# Patient Record
Sex: MALE | Race: WHITE | NOT HISPANIC OR LATINO
[De-identification: names, ages, dates, MRNs, and addresses within clinical notes are randomized per-mention and may not be internally consistent; named-entity substitution may affect disease eponyms.]

---

## 2017-02-18 ENCOUNTER — LAB SERVICES (OUTPATIENT)
Dept: OTHER | Age: 32
End: 2017-02-18

## 2017-02-18 ENCOUNTER — CHARTING TRANS (OUTPATIENT)
Dept: OTHER | Age: 32
End: 2017-02-18

## 2017-02-18 LAB — RAPID STREP GROUP A: NORMAL

## 2017-06-21 ENCOUNTER — CHARTING TRANS (OUTPATIENT)
Dept: OTHER | Age: 32
End: 2017-06-21

## 2017-06-23 ENCOUNTER — HOSPITAL (OUTPATIENT)
Dept: OTHER | Age: 32
End: 2017-06-23
Attending: FAMILY MEDICINE

## 2017-06-23 ENCOUNTER — CHARTING TRANS (OUTPATIENT)
Dept: OTHER | Age: 32
End: 2017-06-23

## 2017-06-23 ENCOUNTER — LAB SERVICES (OUTPATIENT)
Dept: OTHER | Age: 32
End: 2017-06-23

## 2017-06-23 LAB
ALBUMIN SERPL-MCNC: 4.5 G/DL (ref 3.6–5.1)
ALBUMIN/GLOB SERPL: 1.8 (ref 1–2.4)
ALP SERPL-CCNC: 60 UNITS/L (ref 45–117)
ALT SERPL-CCNC: 25 UNITS/L
ANION GAP SERPL CALC-SCNC: 10 MMOL/L (ref 10–20)
AST SERPL-CCNC: 16 UNITS/L
BASOPHILS # BLD: 0 K/MCL (ref 0–0.3)
BASOPHILS NFR BLD: 1 %
BILIRUB SERPL-MCNC: 0.8 MG/DL (ref 0.2–1)
BUN SERPL-MCNC: 12 MG/DL (ref 6–20)
BUN/CREAT SERPL: 13 (ref 7–25)
C TRACH RRNA SPEC QL NAA+PROBE: NEGATIVE
CALCIUM SERPL-MCNC: 9.6 MG/DL (ref 8.4–10.2)
CHLORIDE SERPL-SCNC: 103 MMOL/L (ref 98–107)
CO2 SERPL-SCNC: 32 MMOL/L (ref 21–32)
CREAT SERPL-MCNC: 0.93 MG/DL (ref 0.67–1.17)
DIFFERENTIAL METHOD BLD: NORMAL
EOSINOPHIL # BLD: 0.1 K/MCL (ref 0.1–0.5)
EOSINOPHIL NFR BLD: 2 %
ERYTHROCYTE [DISTWIDTH] IN BLOOD: 13 % (ref 11–15)
GLOBULIN SER-MCNC: 2.5 G/DL (ref 2–4)
GLUCOSE SERPL-MCNC: 85 MG/DL (ref 65–99)
HEMATOCRIT: 45.7 % (ref 39–51)
HEMOGLOBIN: 15.4 G/DL (ref 13–17)
HIV 1+2 AB+HIV1 P24 AG SERPL QL IA: NONREACTIVE
LENGTH OF FAST TIME PATIENT: NORMAL HRS
LYMPHOCYTES # BLD: 2.3 K/MCL (ref 1–4.8)
LYMPHOCYTES NFR BLD: 39 %
MEAN CORPUSCULAR HEMOGLOBIN: 30.6 PG (ref 26–34)
MEAN CORPUSCULAR HGB CONC: 33.7 G/DL (ref 32–36.5)
MEAN CORPUSCULAR VOLUME: 90.9 FL (ref 78–100)
MONOCYTES # BLD: 0.3 K/MCL (ref 0.3–0.9)
MONOCYTES NFR BLD: 6 %
N GONORRHOEA RRNA SPEC QL NAA+PROBE: NEGATIVE
NEUTROPHILS # BLD: 3.1 K/MCL (ref 1.8–7.7)
NEUTROPHILS NFR BLD: 52 %
PLATELET COUNT: 329 K/MCL (ref 140–450)
POTASSIUM SERPL-SCNC: 4.3 MMOL/L (ref 3.4–5.1)
RED CELL COUNT: 5.03 MIL/MCL (ref 4.5–5.9)
RPR SER QL: NONREACTIVE
SODIUM SERPL-SCNC: 141 MMOL/L (ref 135–145)
SPECIMEN SOURCE: NORMAL
TOTAL PROTEIN: 7 G/DL (ref 6.4–8.2)
TSH SERPL-ACNC: 2.31 MCUNITS/ML (ref 0.35–5)
WHITE BLOOD COUNT: 5.8 K/MCL (ref 4.2–11)

## 2017-06-26 ENCOUNTER — CHARTING TRANS (OUTPATIENT)
Dept: OTHER | Age: 32
End: 2017-06-26

## 2017-06-26 LAB
APPEARANCE SEMIN PLAS: NORMAL
COLLECTION METHOD SMN: NORMAL
LIQUEFACTION TIME SMN: NORMAL
PH SMN: 8.5
SEX ABSTIN DURATION TIME PATIENT: 6 (ref 2–7)
SPECIMEN VOL SMN: 3 ML
SPERM # SMN: 190 MIL/ML
SPERM IMMOTILE NFR SMN: 37 %
SPERM MOTILE 1H P EJAC NFR SMN: 49 %
SPERM NONPROG NFR SMN: 14 %
SPERM NORM NFR SMN: 16 %
TYPE OF CONTAINER: NORMAL
VISC SMN QL: NORMAL

## 2017-08-18 ENCOUNTER — CHARTING TRANS (OUTPATIENT)
Dept: OTHER | Age: 32
End: 2017-08-18

## 2017-10-02 ENCOUNTER — CHARTING TRANS (OUTPATIENT)
Dept: OTHER | Age: 32
End: 2017-10-02

## 2017-10-02 ASSESSMENT — PAIN SCALES - GENERAL: PAINLEVEL_OUTOF10: 2

## 2018-10-10 ENCOUNTER — LAB SERVICES (OUTPATIENT)
Dept: OTHER | Age: 33
End: 2018-10-10

## 2018-10-10 ENCOUNTER — CHARTING TRANS (OUTPATIENT)
Dept: OTHER | Age: 33
End: 2018-10-10

## 2018-10-10 ASSESSMENT — PAIN SCALES - GENERAL: PAINLEVEL_OUTOF10: 0

## 2018-10-11 ENCOUNTER — CHARTING TRANS (OUTPATIENT)
Dept: OTHER | Age: 33
End: 2018-10-11

## 2018-10-11 LAB
ALBUMIN SERPL-MCNC: 4.6 G/DL (ref 3.6–5.1)
ALBUMIN/GLOB SERPL: 1.5 (ref 1–2.4)
ALP SERPL-CCNC: 72 UNITS/L (ref 45–117)
ALT SERPL-CCNC: 40 UNITS/L
ANION GAP SERPL CALC-SCNC: 16 MMOL/L (ref 10–20)
AST SERPL-CCNC: 26 UNITS/L
BASOPHILS # BLD: 0 K/MCL (ref 0–0.3)
BASOPHILS NFR BLD: 1 %
BILIRUB SERPL-MCNC: 0.9 MG/DL (ref 0.2–1)
BUN SERPL-MCNC: 19 MG/DL (ref 6–20)
BUN/CREAT SERPL: 17 (ref 7–25)
CALCIUM SERPL-MCNC: 9.5 MG/DL (ref 8.4–10.2)
CHLORIDE SERPL-SCNC: 108 MMOL/L (ref 98–107)
CHOLEST SERPL-MCNC: 158 MG/DL
CHOLEST/HDLC SERPL: 3.2
CO2 SERPL-SCNC: 24 MMOL/L (ref 21–32)
CREAT SERPL-MCNC: 1.1 MG/DL (ref 0.67–1.17)
DIFFERENTIAL METHOD BLD: ABNORMAL
EOSINOPHIL # BLD: 0.1 K/MCL (ref 0.1–0.5)
EOSINOPHIL NFR BLD: 2 %
ERYTHROCYTE [DISTWIDTH] IN BLOOD: 12.3 % (ref 11–15)
GLOBULIN SER-MCNC: 3.1 G/DL (ref 2–4)
GLUCOSE SERPL-MCNC: 97 MG/DL (ref 65–99)
HDLC SERPL-MCNC: 49 MG/DL
HEMATOCRIT: 48.9 % (ref 39–51)
HEMOGLOBIN: 16.1 G/DL (ref 13–17)
IMM GRANULOCYTES # BLD AUTO: 0 K/MCL (ref 0–0.2)
IMM GRANULOCYTES NFR BLD: 0 %
LDLC SERPL CALC-MCNC: 70 MG/DL
LENGTH OF FAST TIME PATIENT: ABNORMAL HRS
LENGTH OF FAST TIME PATIENT: ABNORMAL HRS
LYMPHOCYTES # BLD: 1.9 K/MCL (ref 1–4.8)
LYMPHOCYTES NFR BLD: 40 %
MEAN CORPUSCULAR HEMOGLOBIN: 29.5 PG (ref 26–34)
MEAN CORPUSCULAR HGB CONC: 32.9 G/DL (ref 32–36.5)
MEAN CORPUSCULAR VOLUME: 89.7 FL (ref 78–100)
MONOCYTES # BLD: 0.6 K/MCL (ref 0.3–0.9)
MONOCYTES NFR BLD: 13 %
NEUTROPHILS # BLD: 2.1 K/MCL (ref 1.8–7.7)
NEUTROPHILS NFR BLD: 44 %
NONHDLC SERPL-MCNC: 109 MG/DL
NRBC (NRBCRE): 0 /100 WBC
PLATELET COUNT: 285 K/MCL (ref 140–450)
POTASSIUM SERPL-SCNC: 4.4 MMOL/L (ref 3.4–5.1)
RED CELL COUNT: 5.45 MIL/MCL (ref 4.5–5.9)
SODIUM SERPL-SCNC: 144 MMOL/L (ref 135–145)
TOTAL PROTEIN: 7.7 G/DL (ref 6.4–8.2)
TRIGL SERPL-MCNC: 193 MG/DL
TSH SERPL-ACNC: 1.83 MCUNITS/ML (ref 0.35–5)
WHITE BLOOD COUNT: 4.8 K/MCL (ref 4.2–11)

## 2018-11-02 VITALS
RESPIRATION RATE: 16 BRPM | HEIGHT: 68 IN | BODY MASS INDEX: 29.4 KG/M2 | HEART RATE: 68 BPM | TEMPERATURE: 97.8 F | OXYGEN SATURATION: 98 % | DIASTOLIC BLOOD PRESSURE: 70 MMHG | WEIGHT: 194 LBS | SYSTOLIC BLOOD PRESSURE: 124 MMHG

## 2018-11-03 VITALS
RESPIRATION RATE: 16 BRPM | HEART RATE: 56 BPM | DIASTOLIC BLOOD PRESSURE: 72 MMHG | WEIGHT: 188.56 LBS | BODY MASS INDEX: 28.58 KG/M2 | HEIGHT: 68 IN | TEMPERATURE: 98.1 F | SYSTOLIC BLOOD PRESSURE: 116 MMHG

## 2018-11-03 VITALS
HEART RATE: 62 BPM | SYSTOLIC BLOOD PRESSURE: 110 MMHG | OXYGEN SATURATION: 99 % | WEIGHT: 195 LBS | BODY MASS INDEX: 29.55 KG/M2 | HEIGHT: 68 IN | TEMPERATURE: 98.4 F | DIASTOLIC BLOOD PRESSURE: 78 MMHG | RESPIRATION RATE: 16 BRPM

## 2018-11-05 VITALS
DIASTOLIC BLOOD PRESSURE: 80 MMHG | TEMPERATURE: 98.2 F | HEART RATE: 68 BPM | RESPIRATION RATE: 16 BRPM | SYSTOLIC BLOOD PRESSURE: 124 MMHG | OXYGEN SATURATION: 98 %

## 2018-11-27 VITALS
SYSTOLIC BLOOD PRESSURE: 126 MMHG | RESPIRATION RATE: 16 BRPM | HEART RATE: 72 BPM | OXYGEN SATURATION: 99 % | WEIGHT: 206 LBS | HEIGHT: 68 IN | BODY MASS INDEX: 31.22 KG/M2 | DIASTOLIC BLOOD PRESSURE: 72 MMHG | TEMPERATURE: 98.1 F

## 2019-03-03 ENCOUNTER — WALK IN (OUTPATIENT)
Dept: URGENT CARE | Age: 34
End: 2019-03-03

## 2019-03-03 DIAGNOSIS — J06.9 VIRAL UPPER RESPIRATORY TRACT INFECTION: Primary | ICD-10-CM

## 2019-03-03 PROCEDURE — 99213 OFFICE O/P EST LOW 20 MIN: CPT | Performed by: NURSE PRACTITIONER

## 2019-03-03 ASSESSMENT — ENCOUNTER SYMPTOMS
SINUS PAIN: 0
CHILLS: 0
VOMITING: 0
RHINORRHEA: 1
SINUS PRESSURE: 1
FATIGUE: 0
SORE THROAT: 0
PSYCHIATRIC NEGATIVE: 1
NAUSEA: 0
FEVER: 0
NEUROLOGICAL NEGATIVE: 1
SHORTNESS OF BREATH: 0
DIARRHEA: 0
COUGH: 1
EYES NEGATIVE: 1
WHEEZING: 0

## 2019-03-03 ASSESSMENT — PAIN SCALES - GENERAL: PAINLEVEL: 1-2

## 2019-08-06 ENCOUNTER — OFFICE VISIT (OUTPATIENT)
Dept: FAMILY MEDICINE | Age: 34
End: 2019-08-06

## 2019-08-06 DIAGNOSIS — J06.9 ACUTE URI: Primary | ICD-10-CM

## 2019-08-06 PROCEDURE — 99214 OFFICE O/P EST MOD 30 MIN: CPT | Performed by: FAMILY MEDICINE

## 2019-08-06 RX ORDER — AMOXICILLIN 500 MG/1
500 CAPSULE ORAL 3 TIMES DAILY
Qty: 21 CAPSULE | Refills: 0 | Status: SHIPPED | OUTPATIENT
Start: 2019-08-06 | End: 2019-08-13

## 2019-08-06 ASSESSMENT — PAIN SCALES - GENERAL: PAINLEVEL: 0

## 2019-08-06 ASSESSMENT — ENCOUNTER SYMPTOMS
HEMATOLOGIC/LYMPHATIC NEGATIVE: 1
ENDOCRINE NEGATIVE: 1
VOICE CHANGE: 1
NEUROLOGICAL NEGATIVE: 1
ALLERGIC/IMMUNOLOGIC NEGATIVE: 1
RESPIRATORY NEGATIVE: 1
CONSTITUTIONAL NEGATIVE: 1
EYES NEGATIVE: 1
GASTROINTESTINAL NEGATIVE: 1
SORE THROAT: 1

## 2019-08-06 ASSESSMENT — PATIENT HEALTH QUESTIONNAIRE - PHQ9
2. FEELING DOWN, DEPRESSED OR HOPELESS: NOT AT ALL
SUM OF ALL RESPONSES TO PHQ9 QUESTIONS 1 AND 2: 0
SUM OF ALL RESPONSES TO PHQ9 QUESTIONS 1 AND 2: 0
1. LITTLE INTEREST OR PLEASURE IN DOING THINGS: NOT AT ALL

## 2021-05-25 VITALS
SYSTOLIC BLOOD PRESSURE: 124 MMHG | WEIGHT: 204 LBS | SYSTOLIC BLOOD PRESSURE: 106 MMHG | TEMPERATURE: 97.8 F | WEIGHT: 200 LBS | HEIGHT: 68 IN | HEIGHT: 68 IN | HEART RATE: 60 BPM | BODY MASS INDEX: 30.31 KG/M2 | RESPIRATION RATE: 16 BRPM | OXYGEN SATURATION: 97 % | OXYGEN SATURATION: 99 % | DIASTOLIC BLOOD PRESSURE: 70 MMHG | BODY MASS INDEX: 30.92 KG/M2 | DIASTOLIC BLOOD PRESSURE: 76 MMHG | HEART RATE: 54 BPM | TEMPERATURE: 97.3 F | RESPIRATION RATE: 16 BRPM

## 2021-06-19 ENCOUNTER — OFFICE VISIT (OUTPATIENT)
Dept: FAMILY MEDICINE CLINIC | Facility: CLINIC | Age: 36
End: 2021-06-19
Payer: COMMERCIAL

## 2021-06-19 VITALS
RESPIRATION RATE: 20 BRPM | BODY MASS INDEX: 31.37 KG/M2 | OXYGEN SATURATION: 99 % | HEIGHT: 68 IN | WEIGHT: 207 LBS | TEMPERATURE: 98 F | DIASTOLIC BLOOD PRESSURE: 78 MMHG | SYSTOLIC BLOOD PRESSURE: 118 MMHG | HEART RATE: 64 BPM

## 2021-06-19 DIAGNOSIS — Z30.09 VASECTOMY EVALUATION: ICD-10-CM

## 2021-06-19 DIAGNOSIS — E55.9 VITAMIN D DEFICIENCY: ICD-10-CM

## 2021-06-19 DIAGNOSIS — Z00.00 ANNUAL PHYSICAL EXAM: Primary | ICD-10-CM

## 2021-06-19 PROCEDURE — 99385 PREV VISIT NEW AGE 18-39: CPT | Performed by: FAMILY MEDICINE

## 2021-06-19 PROCEDURE — 3008F BODY MASS INDEX DOCD: CPT | Performed by: FAMILY MEDICINE

## 2021-06-19 PROCEDURE — 3074F SYST BP LT 130 MM HG: CPT | Performed by: FAMILY MEDICINE

## 2021-06-19 PROCEDURE — 3078F DIAST BP <80 MM HG: CPT | Performed by: FAMILY MEDICINE

## 2021-06-19 NOTE — H&P
Lona Cooks is a 38year old male who presents for a complete physical exam.   HPI:     Dads side diabetes and CAD >65. Moms side breast cancer and lung cancer. No personal exposure to second hand smoke. Hx chewing tobacco for >20 years- he quit.  He 31.47 kg/m².      GENERAL: well developed, well nourished,in no apparent distress  SKIN: no rashes,no suspicious lesions  HEENT: atraumatic, normocephalic, PERRLA, conjunctiva clear, TMs clear, posterior pharynx clear, no congestion  NECK: supple,no adenopa

## 2021-06-21 ENCOUNTER — LAB ENCOUNTER (OUTPATIENT)
Dept: LAB | Age: 36
End: 2021-06-21
Attending: FAMILY MEDICINE
Payer: COMMERCIAL

## 2021-06-21 DIAGNOSIS — Z00.00 ANNUAL PHYSICAL EXAM: ICD-10-CM

## 2021-06-21 DIAGNOSIS — E55.9 VITAMIN D DEFICIENCY: ICD-10-CM

## 2021-06-21 PROCEDURE — 85025 COMPLETE CBC W/AUTO DIFF WBC: CPT

## 2021-06-21 PROCEDURE — 80053 COMPREHEN METABOLIC PANEL: CPT

## 2021-06-21 PROCEDURE — 84443 ASSAY THYROID STIM HORMONE: CPT

## 2021-06-21 PROCEDURE — 82306 VITAMIN D 25 HYDROXY: CPT

## 2021-06-21 PROCEDURE — 36415 COLL VENOUS BLD VENIPUNCTURE: CPT

## 2021-06-21 PROCEDURE — 80061 LIPID PANEL: CPT

## 2021-09-08 ENCOUNTER — TELEPHONE (OUTPATIENT)
Dept: FAMILY MEDICINE CLINIC | Facility: CLINIC | Age: 36
End: 2021-09-08

## 2021-09-09 RX ORDER — ERGOCALCIFEROL 1.25 MG/1
CAPSULE ORAL
Qty: 12 CAPSULE | Refills: 0 | OUTPATIENT
Start: 2021-09-09

## 2022-06-13 ENCOUNTER — OFFICE VISIT (OUTPATIENT)
Dept: FAMILY MEDICINE CLINIC | Facility: CLINIC | Age: 37
End: 2022-06-13
Payer: COMMERCIAL

## 2022-06-13 ENCOUNTER — LAB ENCOUNTER (OUTPATIENT)
Dept: LAB | Age: 37
End: 2022-06-13
Attending: FAMILY MEDICINE
Payer: COMMERCIAL

## 2022-06-13 VITALS
DIASTOLIC BLOOD PRESSURE: 74 MMHG | SYSTOLIC BLOOD PRESSURE: 120 MMHG | TEMPERATURE: 97 F | HEART RATE: 64 BPM | WEIGHT: 215 LBS | OXYGEN SATURATION: 98 % | HEIGHT: 68 IN | RESPIRATION RATE: 18 BRPM | BODY MASS INDEX: 32.58 KG/M2

## 2022-06-13 DIAGNOSIS — M25.60 STIFFNESS IN JOINT: ICD-10-CM

## 2022-06-13 DIAGNOSIS — M25.50 PAIN IN JOINT, MULTIPLE SITES: Primary | ICD-10-CM

## 2022-06-13 DIAGNOSIS — M62.81 MUSCLE WEAKNESS: ICD-10-CM

## 2022-06-13 DIAGNOSIS — M25.50 POLYARTHRALGIA: ICD-10-CM

## 2022-06-13 DIAGNOSIS — M25.50 PAIN IN JOINT, MULTIPLE SITES: ICD-10-CM

## 2022-06-13 LAB
ALBUMIN SERPL-MCNC: 4.2 G/DL (ref 3.4–5)
ALBUMIN/GLOB SERPL: 1.3 {RATIO} (ref 1–2)
ALP LIVER SERPL-CCNC: 79 U/L
ALT SERPL-CCNC: 54 U/L
ANION GAP SERPL CALC-SCNC: 4 MMOL/L (ref 0–18)
AST SERPL-CCNC: 69 U/L (ref 15–37)
BASOPHILS # BLD AUTO: 0.04 X10(3) UL (ref 0–0.2)
BASOPHILS NFR BLD AUTO: 0.8 %
BILIRUB SERPL-MCNC: 0.5 MG/DL (ref 0.1–2)
BUN BLD-MCNC: 17 MG/DL (ref 7–18)
CALCIUM BLD-MCNC: 9.1 MG/DL (ref 8.5–10.1)
CHLORIDE SERPL-SCNC: 107 MMOL/L (ref 98–112)
CO2 SERPL-SCNC: 29 MMOL/L (ref 21–32)
CREAT BLD-MCNC: 1.24 MG/DL
CRP SERPL-MCNC: <0.29 MG/DL (ref ?–0.3)
EOSINOPHIL # BLD AUTO: 0.12 X10(3) UL (ref 0–0.7)
EOSINOPHIL NFR BLD AUTO: 2.4 %
ERYTHROCYTE [DISTWIDTH] IN BLOOD BY AUTOMATED COUNT: 12 %
ERYTHROCYTE [SEDIMENTATION RATE] IN BLOOD: 14 MM/HR
FASTING STATUS PATIENT QL REPORTED: YES
GLOBULIN PLAS-MCNC: 3.3 G/DL (ref 2.8–4.4)
GLUCOSE BLD-MCNC: 105 MG/DL (ref 70–99)
HCT VFR BLD AUTO: 42.5 %
HGB BLD-MCNC: 14.6 G/DL
IMM GRANULOCYTES # BLD AUTO: 0.01 X10(3) UL (ref 0–1)
IMM GRANULOCYTES NFR BLD: 0.2 %
LYMPHOCYTES # BLD AUTO: 2.25 X10(3) UL (ref 1–4)
LYMPHOCYTES NFR BLD AUTO: 44.5 %
MAGNESIUM SERPL-MCNC: 2 MG/DL (ref 1.6–2.6)
MCH RBC QN AUTO: 30.7 PG (ref 26–34)
MCHC RBC AUTO-ENTMCNC: 34.4 G/DL (ref 31–37)
MCV RBC AUTO: 89.3 FL
MONOCYTES # BLD AUTO: 0.3 X10(3) UL (ref 0.1–1)
MONOCYTES NFR BLD AUTO: 5.9 %
NEUTROPHILS # BLD AUTO: 2.34 X10 (3) UL (ref 1.5–7.7)
NEUTROPHILS # BLD AUTO: 2.34 X10(3) UL (ref 1.5–7.7)
NEUTROPHILS NFR BLD AUTO: 46.2 %
OSMOLALITY SERPL CALC.SUM OF ELEC: 292 MOSM/KG (ref 275–295)
PLATELET # BLD AUTO: 328 10(3)UL (ref 150–450)
POTASSIUM SERPL-SCNC: 4.2 MMOL/L (ref 3.5–5.1)
PROT SERPL-MCNC: 7.5 G/DL (ref 6.4–8.2)
RBC # BLD AUTO: 4.76 X10(6)UL
RHEUMATOID FACT SERPL-ACNC: 14 IU/ML (ref ?–15)
SODIUM SERPL-SCNC: 140 MMOL/L (ref 136–145)
WBC # BLD AUTO: 5.1 X10(3) UL (ref 4–11)

## 2022-06-13 PROCEDURE — 86200 CCP ANTIBODY: CPT | Performed by: FAMILY MEDICINE

## 2022-06-13 PROCEDURE — 3074F SYST BP LT 130 MM HG: CPT | Performed by: FAMILY MEDICINE

## 2022-06-13 PROCEDURE — 3008F BODY MASS INDEX DOCD: CPT | Performed by: FAMILY MEDICINE

## 2022-06-13 PROCEDURE — 3078F DIAST BP <80 MM HG: CPT | Performed by: FAMILY MEDICINE

## 2022-06-13 PROCEDURE — 85652 RBC SED RATE AUTOMATED: CPT | Performed by: FAMILY MEDICINE

## 2022-06-13 PROCEDURE — 86789 WEST NILE VIRUS ANTIBODY: CPT | Performed by: FAMILY MEDICINE

## 2022-06-13 PROCEDURE — 86431 RHEUMATOID FACTOR QUANT: CPT | Performed by: FAMILY MEDICINE

## 2022-06-13 PROCEDURE — 86140 C-REACTIVE PROTEIN: CPT | Performed by: FAMILY MEDICINE

## 2022-06-13 PROCEDURE — 86788 WEST NILE VIRUS AB IGM: CPT | Performed by: FAMILY MEDICINE

## 2022-06-13 PROCEDURE — 80053 COMPREHEN METABOLIC PANEL: CPT | Performed by: FAMILY MEDICINE

## 2022-06-13 PROCEDURE — 86038 ANTINUCLEAR ANTIBODIES: CPT | Performed by: FAMILY MEDICINE

## 2022-06-13 PROCEDURE — 99214 OFFICE O/P EST MOD 30 MIN: CPT | Performed by: FAMILY MEDICINE

## 2022-06-13 PROCEDURE — 85025 COMPLETE CBC W/AUTO DIFF WBC: CPT | Performed by: FAMILY MEDICINE

## 2022-06-13 PROCEDURE — 83735 ASSAY OF MAGNESIUM: CPT | Performed by: FAMILY MEDICINE

## 2022-06-15 LAB — ANA SER QL: NEGATIVE

## 2022-06-17 LAB — CCP IGG SERPL-ACNC: 2 U/ML (ref 0–6.9)

## 2022-06-19 LAB
WEST NILE VIRUS AB, IGG, SER: 1.41 IV
WEST NILE VIRUS AB, IGM, SER: 0 IV

## 2022-06-20 ENCOUNTER — OFFICE VISIT (OUTPATIENT)
Dept: FAMILY MEDICINE CLINIC | Facility: CLINIC | Age: 37
End: 2022-06-20
Payer: COMMERCIAL

## 2022-06-20 VITALS
BODY MASS INDEX: 32.43 KG/M2 | HEIGHT: 68 IN | OXYGEN SATURATION: 98 % | HEART RATE: 76 BPM | SYSTOLIC BLOOD PRESSURE: 124 MMHG | RESPIRATION RATE: 16 BRPM | WEIGHT: 214 LBS | DIASTOLIC BLOOD PRESSURE: 80 MMHG

## 2022-06-20 DIAGNOSIS — Z13.220 LIPID SCREENING: ICD-10-CM

## 2022-06-20 DIAGNOSIS — Z00.00 WELLNESS EXAMINATION: Primary | ICD-10-CM

## 2022-06-20 DIAGNOSIS — R76.8: ICD-10-CM

## 2022-06-20 PROCEDURE — 99395 PREV VISIT EST AGE 18-39: CPT | Performed by: FAMILY MEDICINE

## 2022-06-20 PROCEDURE — 3008F BODY MASS INDEX DOCD: CPT | Performed by: FAMILY MEDICINE

## 2022-06-20 PROCEDURE — 3074F SYST BP LT 130 MM HG: CPT | Performed by: FAMILY MEDICINE

## 2022-06-20 PROCEDURE — 3079F DIAST BP 80-89 MM HG: CPT | Performed by: FAMILY MEDICINE

## 2022-10-03 ENCOUNTER — OFFICE VISIT (OUTPATIENT)
Dept: NEUROLOGY | Facility: CLINIC | Age: 37
End: 2022-10-03
Payer: COMMERCIAL

## 2022-10-03 ENCOUNTER — LAB ENCOUNTER (OUTPATIENT)
Dept: LAB | Age: 37
End: 2022-10-03
Attending: Other
Payer: COMMERCIAL

## 2022-10-03 VITALS
SYSTOLIC BLOOD PRESSURE: 124 MMHG | RESPIRATION RATE: 16 BRPM | BODY MASS INDEX: 33 KG/M2 | DIASTOLIC BLOOD PRESSURE: 70 MMHG | HEART RATE: 60 BPM | WEIGHT: 216 LBS

## 2022-10-03 DIAGNOSIS — M79.18 MYALGIA, MULTIPLE SITES: Primary | ICD-10-CM

## 2022-10-03 DIAGNOSIS — M79.18 MYALGIA, MULTIPLE SITES: ICD-10-CM

## 2022-10-03 LAB
CK SERPL-CCNC: 166 U/L
TSI SER-ACNC: 2.93 MIU/ML (ref 0.36–3.74)
VIT B12 SERPL-MCNC: 535 PG/ML (ref 193–986)

## 2022-10-03 PROCEDURE — 82607 VITAMIN B-12: CPT | Performed by: OTHER

## 2022-10-03 PROCEDURE — 82550 ASSAY OF CK (CPK): CPT | Performed by: OTHER

## 2022-10-03 PROCEDURE — 84443 ASSAY THYROID STIM HORMONE: CPT | Performed by: OTHER

## 2022-10-03 PROCEDURE — 82085 ASSAY OF ALDOLASE: CPT | Performed by: OTHER

## 2022-10-03 RX ORDER — ACETAMINOPHEN 500 MG
500 TABLET ORAL AS NEEDED
COMMUNITY

## 2022-10-03 NOTE — PROGRESS NOTES
Patient states in June or May he had all over body pain and a slight headache. Patient states the next day he was unable to move his body. Patient states as the days and weeks passed the heavy sensation in the body started to decreased and he was able to move more. Patient states 7 or 8 years ago, patient had numbness on the right side of the face. Patient states that episode lasted 30 seconds and has not happened again. Patient denies changes in memory, speech or balance. Denies facial twitching. Patient denies numbness, tingling or burning sensation in the body.

## 2022-10-04 LAB — ALDOLASE, SERUM: 5.6 U/L

## 2022-12-09 ENCOUNTER — WALK IN (OUTPATIENT)
Dept: URGENT CARE | Age: 37
End: 2022-12-09

## 2022-12-09 VITALS
SYSTOLIC BLOOD PRESSURE: 132 MMHG | BODY MASS INDEX: 31.52 KG/M2 | WEIGHT: 208 LBS | TEMPERATURE: 98.6 F | OXYGEN SATURATION: 98 % | HEART RATE: 72 BPM | RESPIRATION RATE: 16 BRPM | DIASTOLIC BLOOD PRESSURE: 82 MMHG | HEIGHT: 68 IN

## 2022-12-09 DIAGNOSIS — J01.00 ACUTE NON-RECURRENT MAXILLARY SINUSITIS: Primary | ICD-10-CM

## 2022-12-09 LAB
INTERNAL PROCEDURAL CONTROLS ACCEPTABLE: YES
SARS-COV+SARS-COV-2 AG RESP QL IA.RAPID: NOT DETECTED
TEST LOT EXPIRATION DATE: NORMAL
TEST LOT NUMBER: NORMAL

## 2022-12-09 PROCEDURE — 99212 OFFICE O/P EST SF 10 MIN: CPT | Performed by: NURSE PRACTITIONER

## 2022-12-09 PROCEDURE — 87426 SARSCOV CORONAVIRUS AG IA: CPT | Performed by: NURSE PRACTITIONER

## 2022-12-09 RX ORDER — AMOXICILLIN AND CLAVULANATE POTASSIUM 875; 125 MG/1; MG/1
1 TABLET, FILM COATED ORAL 2 TIMES DAILY
Qty: 10 TABLET | Refills: 0 | Status: SHIPPED | OUTPATIENT
Start: 2022-12-09 | End: 2022-12-14

## 2022-12-09 ASSESSMENT — ENCOUNTER SYMPTOMS
FEVER: 0
RHINORRHEA: 1
TROUBLE SWALLOWING: 0
COUGH: 0
CHILLS: 0
RESPIRATORY NEGATIVE: 1
SORE THROAT: 0

## 2022-12-09 ASSESSMENT — PAIN SCALES - GENERAL: PAINLEVEL: 4

## 2023-06-21 ENCOUNTER — OFFICE VISIT (OUTPATIENT)
Dept: FAMILY MEDICINE CLINIC | Facility: CLINIC | Age: 38
End: 2023-06-21
Payer: COMMERCIAL

## 2023-06-21 VITALS
BODY MASS INDEX: 32.13 KG/M2 | SYSTOLIC BLOOD PRESSURE: 122 MMHG | WEIGHT: 212 LBS | HEIGHT: 68 IN | HEART RATE: 76 BPM | DIASTOLIC BLOOD PRESSURE: 84 MMHG | OXYGEN SATURATION: 98 % | TEMPERATURE: 98 F | RESPIRATION RATE: 16 BRPM

## 2023-06-21 DIAGNOSIS — Z13.220 LIPID SCREENING: ICD-10-CM

## 2023-06-21 DIAGNOSIS — Z00.00 WELLNESS EXAMINATION: Primary | ICD-10-CM

## 2023-06-21 DIAGNOSIS — Z13.0 SCREENING FOR DEFICIENCY ANEMIA: ICD-10-CM

## 2023-06-21 PROCEDURE — 99395 PREV VISIT EST AGE 18-39: CPT | Performed by: FAMILY MEDICINE

## 2023-06-21 PROCEDURE — 3074F SYST BP LT 130 MM HG: CPT | Performed by: FAMILY MEDICINE

## 2023-06-21 PROCEDURE — 3079F DIAST BP 80-89 MM HG: CPT | Performed by: FAMILY MEDICINE

## 2023-06-21 PROCEDURE — 3008F BODY MASS INDEX DOCD: CPT | Performed by: FAMILY MEDICINE

## 2023-07-05 ENCOUNTER — LABORATORY ENCOUNTER (OUTPATIENT)
Dept: LAB | Age: 38
End: 2023-07-05
Attending: FAMILY MEDICINE
Payer: COMMERCIAL

## 2023-07-05 DIAGNOSIS — Z13.0 SCREENING FOR DEFICIENCY ANEMIA: ICD-10-CM

## 2023-07-05 DIAGNOSIS — Z00.00 WELLNESS EXAMINATION: ICD-10-CM

## 2023-07-05 DIAGNOSIS — Z13.220 LIPID SCREENING: ICD-10-CM

## 2023-07-05 LAB
ALBUMIN SERPL-MCNC: 4.1 G/DL (ref 3.4–5)
ALBUMIN/GLOB SERPL: 1.3 {RATIO} (ref 1–2)
ALP LIVER SERPL-CCNC: 58 U/L
ALT SERPL-CCNC: 32 U/L
ANION GAP SERPL CALC-SCNC: 6 MMOL/L (ref 0–18)
AST SERPL-CCNC: 24 U/L (ref 15–37)
BASOPHILS # BLD AUTO: 0.05 X10(3) UL (ref 0–0.2)
BASOPHILS NFR BLD AUTO: 0.9 %
BILIRUB SERPL-MCNC: 1.3 MG/DL (ref 0.1–2)
BUN BLD-MCNC: 14 MG/DL (ref 7–18)
CALCIUM BLD-MCNC: 9.3 MG/DL (ref 8.5–10.1)
CHLORIDE SERPL-SCNC: 109 MMOL/L (ref 98–112)
CHOLEST SERPL-MCNC: 148 MG/DL (ref ?–200)
CO2 SERPL-SCNC: 25 MMOL/L (ref 21–32)
CREAT BLD-MCNC: 1.1 MG/DL
EOSINOPHIL # BLD AUTO: 0.07 X10(3) UL (ref 0–0.7)
EOSINOPHIL NFR BLD AUTO: 1.2 %
ERYTHROCYTE [DISTWIDTH] IN BLOOD BY AUTOMATED COUNT: 12.6 %
FASTING PATIENT LIPID ANSWER: YES
FASTING STATUS PATIENT QL REPORTED: YES
GFR SERPLBLD BASED ON 1.73 SQ M-ARVRAT: 88 ML/MIN/1.73M2 (ref 60–?)
GLOBULIN PLAS-MCNC: 3.2 G/DL (ref 2.8–4.4)
GLUCOSE BLD-MCNC: 101 MG/DL (ref 70–99)
HCT VFR BLD AUTO: 45.7 %
HDLC SERPL-MCNC: 54 MG/DL (ref 40–59)
HGB BLD-MCNC: 15.2 G/DL
IMM GRANULOCYTES # BLD AUTO: 0.01 X10(3) UL (ref 0–1)
IMM GRANULOCYTES NFR BLD: 0.2 %
LDLC SERPL CALC-MCNC: 81 MG/DL (ref ?–100)
LYMPHOCYTES # BLD AUTO: 2.58 X10(3) UL (ref 1–4)
LYMPHOCYTES NFR BLD AUTO: 44.9 %
MCH RBC QN AUTO: 29.6 PG (ref 26–34)
MCHC RBC AUTO-ENTMCNC: 33.3 G/DL (ref 31–37)
MCV RBC AUTO: 89.1 FL
MONOCYTES # BLD AUTO: 0.44 X10(3) UL (ref 0.1–1)
MONOCYTES NFR BLD AUTO: 7.7 %
NEUTROPHILS # BLD AUTO: 2.6 X10 (3) UL (ref 1.5–7.7)
NEUTROPHILS # BLD AUTO: 2.6 X10(3) UL (ref 1.5–7.7)
NEUTROPHILS NFR BLD AUTO: 45.1 %
NONHDLC SERPL-MCNC: 94 MG/DL (ref ?–130)
OSMOLALITY SERPL CALC.SUM OF ELEC: 291 MOSM/KG (ref 275–295)
PLATELET # BLD AUTO: 338 10(3)UL (ref 150–450)
POTASSIUM SERPL-SCNC: 4.3 MMOL/L (ref 3.5–5.1)
PROT SERPL-MCNC: 7.3 G/DL (ref 6.4–8.2)
RBC # BLD AUTO: 5.13 X10(6)UL
SODIUM SERPL-SCNC: 140 MMOL/L (ref 136–145)
TRIGL SERPL-MCNC: 62 MG/DL (ref 30–149)
VLDLC SERPL CALC-MCNC: 10 MG/DL (ref 0–30)
WBC # BLD AUTO: 5.8 X10(3) UL (ref 4–11)

## 2023-07-05 PROCEDURE — 80061 LIPID PANEL: CPT | Performed by: FAMILY MEDICINE

## 2023-07-05 PROCEDURE — 80053 COMPREHEN METABOLIC PANEL: CPT | Performed by: FAMILY MEDICINE

## 2023-07-05 PROCEDURE — 85025 COMPLETE CBC W/AUTO DIFF WBC: CPT | Performed by: FAMILY MEDICINE

## 2024-05-02 ENCOUNTER — LAB ENCOUNTER (OUTPATIENT)
Dept: LAB | Age: 39
End: 2024-05-02
Attending: FAMILY MEDICINE
Payer: COMMERCIAL

## 2024-05-02 ENCOUNTER — OFFICE VISIT (OUTPATIENT)
Dept: FAMILY MEDICINE CLINIC | Facility: CLINIC | Age: 39
End: 2024-05-02
Payer: COMMERCIAL

## 2024-05-02 VITALS
WEIGHT: 217 LBS | BODY MASS INDEX: 32.89 KG/M2 | RESPIRATION RATE: 16 BRPM | OXYGEN SATURATION: 97 % | HEIGHT: 68 IN | DIASTOLIC BLOOD PRESSURE: 64 MMHG | HEART RATE: 65 BPM | SYSTOLIC BLOOD PRESSURE: 118 MMHG

## 2024-05-02 DIAGNOSIS — R10.2 SUPRAPUBIC PAIN: ICD-10-CM

## 2024-05-02 DIAGNOSIS — R10.2 SUPRAPUBIC PAIN: Primary | ICD-10-CM

## 2024-05-02 LAB
ANION GAP SERPL CALC-SCNC: 7 MMOL/L (ref 0–18)
APPEARANCE: CLEAR
BASOPHILS # BLD AUTO: 0.06 X10(3) UL (ref 0–0.2)
BASOPHILS NFR BLD AUTO: 0.9 %
BILIRUBIN: NEGATIVE
BUN BLD-MCNC: 18 MG/DL (ref 9–23)
CALCIUM BLD-MCNC: 9.6 MG/DL (ref 8.5–10.1)
CHLORIDE SERPL-SCNC: 109 MMOL/L (ref 98–112)
CO2 SERPL-SCNC: 25 MMOL/L (ref 21–32)
COMPLEXED PSA SERPL-MCNC: 0.26 NG/ML (ref ?–4)
CREAT BLD-MCNC: 1.3 MG/DL
CRP SERPL-MCNC: 0.57 MG/DL (ref ?–0.3)
EGFRCR SERPLBLD CKD-EPI 2021: 71 ML/MIN/1.73M2 (ref 60–?)
EOSINOPHIL # BLD AUTO: 0.11 X10(3) UL (ref 0–0.7)
EOSINOPHIL NFR BLD AUTO: 1.7 %
ERYTHROCYTE [DISTWIDTH] IN BLOOD BY AUTOMATED COUNT: 12 %
FASTING STATUS PATIENT QL REPORTED: NO
GLUCOSE (URINE DIPSTICK): NEGATIVE MG/DL
GLUCOSE BLD-MCNC: 103 MG/DL (ref 70–99)
HCT VFR BLD AUTO: 42.6 %
HGB BLD-MCNC: 15.1 G/DL
IMM GRANULOCYTES # BLD AUTO: 0.01 X10(3) UL (ref 0–1)
IMM GRANULOCYTES NFR BLD: 0.2 %
KETONES (URINE DIPSTICK): NEGATIVE MG/DL
LEUKOCYTES: NEGATIVE
LYMPHOCYTES # BLD AUTO: 2.49 X10(3) UL (ref 1–4)
LYMPHOCYTES NFR BLD AUTO: 37.8 %
MCH RBC QN AUTO: 30.8 PG (ref 26–34)
MCHC RBC AUTO-ENTMCNC: 35.4 G/DL (ref 31–37)
MCV RBC AUTO: 86.8 FL
MONOCYTES # BLD AUTO: 0.41 X10(3) UL (ref 0.1–1)
MONOCYTES NFR BLD AUTO: 6.2 %
MULTISTIX LOT#: NORMAL NUMERIC
NEUTROPHILS # BLD AUTO: 3.51 X10 (3) UL (ref 1.5–7.7)
NEUTROPHILS # BLD AUTO: 3.51 X10(3) UL (ref 1.5–7.7)
NEUTROPHILS NFR BLD AUTO: 53.2 %
NITRITE, URINE: NEGATIVE
OCCULT BLOOD: NEGATIVE
OSMOLALITY SERPL CALC.SUM OF ELEC: 294 MOSM/KG (ref 275–295)
PH, URINE: 6.5 (ref 4.5–8)
PLATELET # BLD AUTO: 354 10(3)UL (ref 150–450)
POTASSIUM SERPL-SCNC: 4.3 MMOL/L (ref 3.5–5.1)
PROTEIN (URINE DIPSTICK): NEGATIVE MG/DL
RBC # BLD AUTO: 4.91 X10(6)UL
SODIUM SERPL-SCNC: 141 MMOL/L (ref 136–145)
SPECIFIC GRAVITY: 1.02 (ref 1–1.03)
URINE-COLOR: YELLOW
UROBILINOGEN,SEMI-QN: 0.2 MG/DL (ref 0–1.9)
WBC # BLD AUTO: 6.6 X10(3) UL (ref 4–11)

## 2024-05-02 PROCEDURE — 86140 C-REACTIVE PROTEIN: CPT | Performed by: FAMILY MEDICINE

## 2024-05-02 PROCEDURE — 99214 OFFICE O/P EST MOD 30 MIN: CPT | Performed by: FAMILY MEDICINE

## 2024-05-02 PROCEDURE — 3074F SYST BP LT 130 MM HG: CPT | Performed by: FAMILY MEDICINE

## 2024-05-02 PROCEDURE — 84153 ASSAY OF PSA TOTAL: CPT | Performed by: FAMILY MEDICINE

## 2024-05-02 PROCEDURE — 3008F BODY MASS INDEX DOCD: CPT | Performed by: FAMILY MEDICINE

## 2024-05-02 PROCEDURE — 81003 URINALYSIS AUTO W/O SCOPE: CPT | Performed by: FAMILY MEDICINE

## 2024-05-02 PROCEDURE — 80048 BASIC METABOLIC PNL TOTAL CA: CPT | Performed by: FAMILY MEDICINE

## 2024-05-02 PROCEDURE — 3078F DIAST BP <80 MM HG: CPT | Performed by: FAMILY MEDICINE

## 2024-05-02 PROCEDURE — 85025 COMPLETE CBC W/AUTO DIFF WBC: CPT | Performed by: FAMILY MEDICINE

## 2024-05-02 NOTE — PROGRESS NOTES
Here with 1 week of anterior pelvic pain.  Played golf last Friday.  No alcohol on the golf course because he had a work that night.  No food on the golf course.  Did drink plenty of water.  Had a frozen pizza for dinner which is typical.  Then developed anterior pelvic pain.  Had a bowel movement which seemed normal.  Had a second bowel movement that night which is unusual for him.  No bloody or black stools.  No constipation or diarrhea.  He denies fevers or chills.  There is still tenderness to palpation.  Over the weekend when his 3 or 4-year-old jumped on him he would have pain in the lower pelvis.  Needed to take a quick break.  Does not radiate down the legs.  No numbness or tingling in the legs.  No dysuria frequency or urgency.  No hematuria.    PAST MEDICAL HISTORY:  History reviewed. No pertinent past medical history.  PAST SURGICAL HISTORY:  Past Surgical History:   Procedure Laterality Date    Vasectomy  02/04/2022    Dr. Lentz     MEDICATIONS:  Current Outpatient Medications   Medication Sig Dispense Refill    Cholecalciferol (VITAMIN D3) 25 MCG (1000 UT) Oral Cap Take 1 tablet by mouth daily.      acetaminophen 500 MG Oral Tab Take 1 tablet (500 mg total) by mouth as needed for Pain.       ALLERGIES:   Patient has no known allergies.  FAMILY HISTORY  Family History   Problem Relation Age of Onset    Other (ibs) Sister     Other (ibs) Sister        PHYSICAL EXAM:  /64   Pulse 65   Resp 16   Ht 5' 8\" (1.727 m)   Wt 217 lb (98.4 kg)   SpO2 97%   BMI 32.99 kg/m²     Alert no acute distress breathing comfortably.  Neck no supraclavicular lymphadenopathy.  Back no CVA tenderness.  Abdomen soft tender just in front of the pelvis.  No redness or bruising.  No rash.  Remainder the abdominal exam is unremarkable with no masses no inguinal lymphadenopathy no organomegaly.    Urine analysis is negative    ASSESSMENT/PLAN:    1. Suprapubic pain  Etiology uncertain.  He has 2 sisters with irritable  bowel syndrome.  We discussed that a cousin of this would be interstitial cystitis although the onset was quite abrupt.  Will check labs and then pelvic ultrasound.  If these are unremarkable referral to urology for cystoscopy.  - Basic Metabolic Panel (8); Future  - C-Reactive Protein; Future  - PSA Total, Screen; Future  - CBC With Differential With Platelet; Future  - US BLADDER ONLY (CPT=76857); Future  - Urine Dip, auto without Micro         If this note is coded by time based on the Office/Outpatient Evaluation and Management Codes effective January 1, 2021, the time includes reviewing the chart before entering the exam room, the time spent with the patient in face to face discussion and examination, and the time documenting the visit.  It may also include time ordering tests or reviewing test results completed on the same day.

## 2024-05-03 PROBLEM — R79.82 ELEVATED C-REACTIVE PROTEIN (CRP): Status: ACTIVE | Noted: 2024-05-03

## 2024-05-03 PROBLEM — R10.2 SUPRAPUBIC PAIN: Status: ACTIVE | Noted: 2024-05-03

## 2024-06-25 ENCOUNTER — OFFICE VISIT (OUTPATIENT)
Dept: FAMILY MEDICINE CLINIC | Facility: CLINIC | Age: 39
End: 2024-06-25

## 2024-06-25 ENCOUNTER — LAB ENCOUNTER (OUTPATIENT)
Dept: LAB | Age: 39
End: 2024-06-25
Attending: FAMILY MEDICINE

## 2024-06-25 VITALS
DIASTOLIC BLOOD PRESSURE: 68 MMHG | SYSTOLIC BLOOD PRESSURE: 110 MMHG | BODY MASS INDEX: 32 KG/M2 | HEART RATE: 78 BPM | WEIGHT: 209 LBS | OXYGEN SATURATION: 97 %

## 2024-06-25 DIAGNOSIS — Z00.00 ANNUAL PHYSICAL EXAM: ICD-10-CM

## 2024-06-25 DIAGNOSIS — G47.33 OSA (OBSTRUCTIVE SLEEP APNEA): ICD-10-CM

## 2024-06-25 DIAGNOSIS — E55.9 VITAMIN D DEFICIENCY: ICD-10-CM

## 2024-06-25 DIAGNOSIS — Z00.00 ANNUAL PHYSICAL EXAM: Primary | ICD-10-CM

## 2024-06-25 LAB
ALBUMIN SERPL-MCNC: 4.1 G/DL (ref 3.4–5)
ALBUMIN/GLOB SERPL: 1.4 {RATIO} (ref 1–2)
ALP LIVER SERPL-CCNC: 56 U/L
ALT SERPL-CCNC: 32 U/L
ANION GAP SERPL CALC-SCNC: 6 MMOL/L (ref 0–18)
AST SERPL-CCNC: 28 U/L (ref 15–37)
BASOPHILS # BLD AUTO: 0.05 X10(3) UL (ref 0–0.2)
BASOPHILS NFR BLD AUTO: 0.9 %
BILIRUB SERPL-MCNC: 1 MG/DL (ref 0.1–2)
BUN BLD-MCNC: 13 MG/DL (ref 9–23)
CALCIUM BLD-MCNC: 9.2 MG/DL (ref 8.5–10.1)
CHLORIDE SERPL-SCNC: 110 MMOL/L (ref 98–112)
CHOLEST SERPL-MCNC: 146 MG/DL (ref ?–200)
CO2 SERPL-SCNC: 26 MMOL/L (ref 21–32)
CREAT BLD-MCNC: 1.15 MG/DL
EGFRCR SERPLBLD CKD-EPI 2021: 83 ML/MIN/1.73M2 (ref 60–?)
EOSINOPHIL # BLD AUTO: 0.09 X10(3) UL (ref 0–0.7)
EOSINOPHIL NFR BLD AUTO: 1.6 %
ERYTHROCYTE [DISTWIDTH] IN BLOOD BY AUTOMATED COUNT: 12.4 %
FASTING PATIENT LIPID ANSWER: YES
FASTING STATUS PATIENT QL REPORTED: YES
GLOBULIN PLAS-MCNC: 2.9 G/DL (ref 2.8–4.4)
GLUCOSE BLD-MCNC: 100 MG/DL (ref 70–99)
HCT VFR BLD AUTO: 44.7 %
HDLC SERPL-MCNC: 52 MG/DL (ref 40–59)
HGB BLD-MCNC: 15.5 G/DL
IMM GRANULOCYTES # BLD AUTO: 0.01 X10(3) UL (ref 0–1)
IMM GRANULOCYTES NFR BLD: 0.2 %
LDLC SERPL CALC-MCNC: 81 MG/DL (ref ?–100)
LYMPHOCYTES # BLD AUTO: 2.53 X10(3) UL (ref 1–4)
LYMPHOCYTES NFR BLD AUTO: 43.6 %
MCH RBC QN AUTO: 30.9 PG (ref 26–34)
MCHC RBC AUTO-ENTMCNC: 34.7 G/DL (ref 31–37)
MCV RBC AUTO: 89.2 FL
MONOCYTES # BLD AUTO: 0.36 X10(3) UL (ref 0.1–1)
MONOCYTES NFR BLD AUTO: 6.2 %
NEUTROPHILS # BLD AUTO: 2.76 X10 (3) UL (ref 1.5–7.7)
NEUTROPHILS # BLD AUTO: 2.76 X10(3) UL (ref 1.5–7.7)
NEUTROPHILS NFR BLD AUTO: 47.5 %
NONHDLC SERPL-MCNC: 94 MG/DL (ref ?–130)
OSMOLALITY SERPL CALC.SUM OF ELEC: 294 MOSM/KG (ref 275–295)
PLATELET # BLD AUTO: 324 10(3)UL (ref 150–450)
POTASSIUM SERPL-SCNC: 4.1 MMOL/L (ref 3.5–5.1)
PROT SERPL-MCNC: 7 G/DL (ref 6.4–8.2)
RBC # BLD AUTO: 5.01 X10(6)UL
SODIUM SERPL-SCNC: 142 MMOL/L (ref 136–145)
TRIGL SERPL-MCNC: 67 MG/DL (ref 30–149)
TSI SER-ACNC: 2.86 MIU/ML (ref 0.36–3.74)
VIT D+METAB SERPL-MCNC: 29.3 NG/ML (ref 30–100)
VLDLC SERPL CALC-MCNC: 10 MG/DL (ref 0–30)
WBC # BLD AUTO: 5.8 X10(3) UL (ref 4–11)

## 2024-06-25 PROCEDURE — 3078F DIAST BP <80 MM HG: CPT | Performed by: FAMILY MEDICINE

## 2024-06-25 PROCEDURE — 80050 GENERAL HEALTH PANEL: CPT | Performed by: FAMILY MEDICINE

## 2024-06-25 PROCEDURE — 3074F SYST BP LT 130 MM HG: CPT | Performed by: FAMILY MEDICINE

## 2024-06-25 PROCEDURE — 82306 VITAMIN D 25 HYDROXY: CPT | Performed by: FAMILY MEDICINE

## 2024-06-25 PROCEDURE — 80061 LIPID PANEL: CPT | Performed by: FAMILY MEDICINE

## 2024-06-25 PROCEDURE — 99396 PREV VISIT EST AGE 40-64: CPT | Performed by: FAMILY MEDICINE

## 2024-06-25 NOTE — H&P
Peter Newby is a 38 year old male who presents for a complete physical exam.   HPI:   Pt complains of snoring, unrefreshing sleeping, apnea, daytime somnolence.    Had a bowel movement 1.5 months ago then 24 hrs of pain at tip of penis when urinating.  Since then occasional suprapubic pain.  Urine test and psa normal.     Current Outpatient Medications   Medication Sig Dispense Refill    Cholecalciferol (VITAMIN D3) 25 MCG (1000 UT) Oral Cap Take 1 tablet by mouth daily.      acetaminophen 500 MG Oral Tab Take 1 tablet (500 mg total) by mouth as needed for Pain.        No past medical history on file.   Past Surgical History:   Procedure Laterality Date    Vasectomy  02/04/2022    Dr. Lentz      Family History   Problem Relation Age of Onset    Other (ibs) Sister     Other (ibs) Sister       Social History:  Social History     Socioeconomic History    Marital status:    Tobacco Use    Smoking status: Never    Smokeless tobacco: Never   Vaping Use    Vaping status: Never Used   Substance and Sexual Activity    Alcohol use: Yes     Comment: socially     Drug use: Never   Other Topics Concern    Caffeine Concern Yes     Comment: pop weekly, 2 grinds    Exercise Yes     Comment: walking daily          REVIEW OF SYSTEMS:   GENERAL: feels well otherwise  SKIN: denies any unusual skin lesions  EYES:denies blurred vision or double vision  HEENT: denies nasal congestion, sinus pain or ST, denies changes in hearing or vision  LUNGS: denies shortness of breath with exertion or frequent cough  CV: denies chest pain, pressure or palpitations  GI: denies abdominal pain,denies heartburn, denies chronic diarrhea or constipation  : denies nocturia or changes in stream, denies erectile dysfunction  MS: denies back pain, arthralgias or myalgias  NEURO: denies headaches or dizziness  PSYCH: denies depression or anxiety  HEMATOLOGIC: denies hx of anemia, easy bruising or bleeding  ENDOCRINE:denies frequent thirst or  urination, denies unintentional weight gain/loss  ALL/ASTHMA: denies hx of allergy or asthma    EXAM:   /68   Pulse 78   Wt 209 lb (94.8 kg)   SpO2 97%   BMI 31.78 kg/m²   Body mass index is 31.78 kg/m².     GENERAL: well developed, well nourished,in no apparent distress  SKIN: no rashes,no suspicious lesions  HEENT: atraumatic, normocephalic, PERRLA, conjunctiva clear, TMs clear, posterior pharynx clear, no congestion  NECK: supple,no adenopathy,no thyromegaly  LUNGS: clear to auscultation, easy breathing  CV: S1S2, RRR without murmur  GI: good BS's;no masses, HSM or tenderness  : two descended testes,no scrotal tenderness or mass, normal penis no lesions, no hernia  MS: [place], no bony deformities, gait normal  EXT: no cyanosis, clubbing or edema  NEURO: Oriented times three, strength 5/5 x 4 ext, LE DTRs 2+  PSYCH: mood and affect appropriate    ASSESSMENT AND PLAN:   Peter Newby is a 38 year old male who presents for a complete physical exam. Recommended heart healthy diet, routine exercise, and annual flu vaccines.  Routine testicular exams reinforced. The patient indicates understanding of these issues and agrees to the plan.  Follow up in 1 year.  Or 6 wks prn if suprapubic pain not resolved.  1. Annual physical exam  - Lipid Panel; Future  - CBC With Differential With Platelet; Future  - Comp Metabolic Panel (14); Future  - TSH W Reflex To Free T4; Future  - Vitamin D; Future    2. Vitamin D deficiency  - Vitamin D; Future    3. AGUILAR (obstructive sleep apnea)  - Diagnostic Sleep Study-split night PAP implemented if criteria met  - General sleep study; Future    Orders Placed This Encounter   Procedures    Lipid Panel     Standing Status:   Future     Standing Expiration Date:   6/25/2025    CBC With Differential With Platelet     Standing Status:   Future     Standing Expiration Date:   6/25/2025    Comp Metabolic Panel (14)     Standing Status:   Future     Standing Expiration Date:    6/25/2025    TSH W Reflex To Free T4     Standing Status:   Future     Standing Expiration Date:   6/25/2025    Vitamin D     Standing Status:   Future     Standing Expiration Date:   6/25/2025     Order Specific Question:   Please pick the scenario that best fits the purpose for ordering this test     Answer:   General Screening/Vit D deficiency (25-Hydroxy)     Order Specific Question:   Release to patient     Answer:   Immediate

## 2025-01-04 ENCOUNTER — OFFICE VISIT (OUTPATIENT)
Dept: FAMILY MEDICINE CLINIC | Facility: CLINIC | Age: 40
End: 2025-01-04
Payer: COMMERCIAL

## 2025-01-04 VITALS
OXYGEN SATURATION: 99 % | HEIGHT: 68 IN | RESPIRATION RATE: 16 BRPM | WEIGHT: 200 LBS | SYSTOLIC BLOOD PRESSURE: 116 MMHG | BODY MASS INDEX: 30.31 KG/M2 | HEART RATE: 67 BPM | TEMPERATURE: 98 F | DIASTOLIC BLOOD PRESSURE: 64 MMHG

## 2025-01-04 DIAGNOSIS — B02.9 HERPES ZOSTER WITHOUT COMPLICATION: Primary | ICD-10-CM

## 2025-01-04 RX ORDER — VALACYCLOVIR HYDROCHLORIDE 1 G/1
1000 TABLET, FILM COATED ORAL EVERY 8 HOURS
Qty: 21 TABLET | Refills: 0 | Status: SHIPPED | OUTPATIENT
Start: 2025-01-04 | End: 2025-01-11

## 2025-01-04 NOTE — PROGRESS NOTES
CHIEF COMPLAINT:     Chief Complaint   Patient presents with    Rash     Sx onset 2-3 days itch below L shoulder blade, red bumps, itch on and off. Itch to mid chest x today   OTC Benadryl  Denies pain           HPI:    Peter Newby is a 39 year old male who presents for evaluation of a rash.  Per patient rash started in the past 3   days. Rash has been spreading since onset.  Patient has not had similar rash in the past. The rash is characterized by areas of redness with blisters. The affected locations include left mid back and left chest. Patient has treated rash with Benadryl cream.  Associated symptoms include: + pain, + itching, no tingling.  Exposure: none.  no exposure to new skin/laundry products or chemicals.  Has history of chicken pox.   Pertinent negatives include no rash drainage, anorexia, congestion, cough, diarrhea, eye pain, facial edema, fatigue, fever, joint pain, rhinorrhea, shortness of breath, sore throat or vomiting.      Current Outpatient Medications   Medication Sig Dispense Refill    valACYclovir 1 G Oral Tab Take 1 tablet (1,000 mg total) by mouth every 8 (eight) hours for 7 days. 21 tablet 0    Cholecalciferol (VITAMIN D3) 25 MCG (1000 UT) Oral Cap Take 1 tablet by mouth daily.      acetaminophen 500 MG Oral Tab Take 1 tablet (500 mg total) by mouth as needed for Pain.        No past medical history on file.   Past Surgical History:   Procedure Laterality Date    Vasectomy  02/04/2022    Dr. Lentz      Family History   Problem Relation Age of Onset    Other (ibs) Sister     Other (ibs) Sister       Social History     Socioeconomic History    Marital status:    Tobacco Use    Smoking status: Never    Smokeless tobacco: Never   Vaping Use    Vaping status: Never Used   Substance and Sexual Activity    Alcohol use: Yes     Comment: socially     Drug use: Never   Other Topics Concern    Caffeine Concern Yes     Comment: pop weekly, 2 grinds    Exercise Yes     Comment: walking  daily          REVIEW OF SYSTEMS:   GENERAL: feels well otherwise, no fever, no chills.  SKIN: Per HPI. No edema. No ulcerations.  EYES: Denies blurred vision or double vision  HEENT: Denies rhinorrhea, edema of the lips or swelling of throat.  CARDIOVASCULAR: Denies chest pains or palpitations.  LUNGS: Denies shortness of breath with exertion or rest. No cough or wheezing.  LYMPH: Denies enlargement of the lymph nodes.  MUSC/SKEL: Denies joint swelling or joint stiffness.  GI: Denies abdominal pain, N/V/C/D.  NEURO: See HPI.      EXAM:   /64   Pulse 67   Temp 97.7 °F (36.5 °C) (Temporal)   Resp 16   Ht 5' 8\" (1.727 m)   Wt 200 lb (90.7 kg)   SpO2 99%   BMI 30.41 kg/m²   GENERAL: well developed, well nourished,in no apparent distress  SKIN: Rash/lesion(s): erythematous maculopapular rash with vesicles to left mid back and mid/left chest in a dermatomal distribution.  Consistent with herpes zoster.   no s/s secondary infection  EYES: PERRLA, EOMI, conjunctiva are clear  HENT: Head atraumatic, normocephalic. TM's WNL bilaterally. Normal external nose. Nasal mucosa pink without edema. No erythema of the throat. Oropharynx moist without lesions.  NECK:  Supple. Non tender.  LUNGS: Clear to auscultation bilaterally.  No wheezing, rhonchi, or rales.  No diminished breath sounds. No increased work of breathing.   CARDIO: RRR without murmur  GI: Non distended. BS's present X4. No obvious masses, organomegaly, or tenderness upon palpation.  JOINTS: normal ROM of all extremities  LYMPH: No lymphadenopathy.     ASSESSMENT AND PLAN:   Peter Newby is a 39 year old male who presents for evaluation of a rash.     ASSESSMENT:  Encounter Diagnosis   Name Primary?    Herpes zoster without complication Yes       PLAN:  Shingles - natural history discussed in detail including postherpetic neuralgia.  Treat with antiviral medication. Meds and instructions as listed below.    Skin care discussed with patient.  To f/u  with PCP if no improvement in 3 days.    Discussed need to call PCP for severe pain at lesion sites or for any new or worsening symptoms such as excruciating pain, HA, numbness, tingling sensation.  Return to work when lesions are crusted    Meds & Refills for this Visit:  Requested Prescriptions     Signed Prescriptions Disp Refills    valACYclovir 1 G Oral Tab 21 tablet 0     Sig: Take 1 tablet (1,000 mg total) by mouth every 8 (eight) hours for 7 days.         Risks, benefits, and side effects of medication explained and discussed.    There are no Patient Instructions on file for this visit.    The patient indicates understanding of these issues and agrees to the plan.

## 2025-01-20 ENCOUNTER — OFFICE VISIT (OUTPATIENT)
Dept: FAMILY MEDICINE CLINIC | Facility: CLINIC | Age: 40
End: 2025-01-20
Payer: COMMERCIAL

## 2025-01-20 VITALS
OXYGEN SATURATION: 98 % | BODY MASS INDEX: 30.31 KG/M2 | SYSTOLIC BLOOD PRESSURE: 112 MMHG | HEIGHT: 68 IN | WEIGHT: 200 LBS | RESPIRATION RATE: 16 BRPM | DIASTOLIC BLOOD PRESSURE: 64 MMHG | HEART RATE: 65 BPM

## 2025-01-20 DIAGNOSIS — R22.32 MASS OF LEFT AXILLA: ICD-10-CM

## 2025-01-20 DIAGNOSIS — R10.31 RIGHT GROIN PAIN: Primary | ICD-10-CM

## 2025-01-20 PROCEDURE — 99213 OFFICE O/P EST LOW 20 MIN: CPT | Performed by: FAMILY MEDICINE

## 2025-01-20 PROCEDURE — 3008F BODY MASS INDEX DOCD: CPT | Performed by: FAMILY MEDICINE

## 2025-01-20 PROCEDURE — 3078F DIAST BP <80 MM HG: CPT | Performed by: FAMILY MEDICINE

## 2025-01-20 PROCEDURE — 3074F SYST BP LT 130 MM HG: CPT | Performed by: FAMILY MEDICINE

## 2025-01-20 NOTE — PROGRESS NOTES
Subjective:   Patient ID: Peter Newby is a 39 year old male.    Had shingles on January 4th.  On left side midback. Sunday the 5th armpit started hurting.  Has since subsided.      Right groin pain x 3 weeks.          History/Other:   Review of Systems   All other systems reviewed and are negative.    Current Outpatient Medications   Medication Sig Dispense Refill    Cholecalciferol (VITAMIN D3) 25 MCG (1000 UT) Oral Cap Take 1 tablet by mouth daily.      acetaminophen 500 MG Oral Tab Take 1 tablet (500 mg total) by mouth as needed for Pain.       Allergies:Allergies[1]    Objective:   Physical Exam  Vitals reviewed.   Constitutional:       General: He is not in acute distress.     Appearance: He is well-developed. He is not diaphoretic.   Eyes:      General: No scleral icterus.        Right eye: No discharge.         Left eye: No discharge.      Conjunctiva/sclera: Conjunctivae normal.   Cardiovascular:      Rate and Rhythm: Normal rate and regular rhythm.      Heart sounds: Normal heart sounds.   Pulmonary:      Effort: Pulmonary effort is normal. No respiratory distress.      Breath sounds: Normal breath sounds. No wheezing or rales.   Genitourinary:     Comments: Right groin mass feels like a mild hernia.  Skin:     Comments: No axillary mass.       Assessment & Plan:   1. Right groin pain    2. Mass of left axilla      1. Right groin pain  - z [facility] US GROIN RIGHT SH(CPT=76882); Future  - z [facility] US GROIN RIGHT SH(CPT=76882)  - Surgery Referral - In Network    2. Mass of left axilla  Reassured.  Its normal now.    Meds This Visit:  Requested Prescriptions      No prescriptions requested or ordered in this encounter     Imaging & Referrals:  US GROIN RIGHT LIMITED (CPT=76882)  SURGERY - INTERNAL         [1] No Known Allergies

## 2025-02-19 ENCOUNTER — OFFICE VISIT (OUTPATIENT)
Dept: SURGERY | Facility: CLINIC | Age: 40
End: 2025-02-19
Payer: COMMERCIAL

## 2025-02-19 VITALS
TEMPERATURE: 98 F | RESPIRATION RATE: 18 BRPM | HEART RATE: 70 BPM | OXYGEN SATURATION: 97 % | SYSTOLIC BLOOD PRESSURE: 118 MMHG | DIASTOLIC BLOOD PRESSURE: 82 MMHG

## 2025-02-19 DIAGNOSIS — K40.20 BILATERAL INGUINAL HERNIA WITHOUT OBSTRUCTION OR GANGRENE, RECURRENCE NOT SPECIFIED: Primary | ICD-10-CM

## 2025-02-19 DIAGNOSIS — K42.9 UMBILICAL HERNIA WITHOUT OBSTRUCTION AND WITHOUT GANGRENE: ICD-10-CM

## 2025-02-19 PROCEDURE — 3074F SYST BP LT 130 MM HG: CPT | Performed by: SURGERY

## 2025-02-19 PROCEDURE — 3079F DIAST BP 80-89 MM HG: CPT | Performed by: SURGERY

## 2025-02-19 PROCEDURE — 99204 OFFICE O/P NEW MOD 45 MIN: CPT | Performed by: SURGERY

## 2025-02-19 NOTE — H&P
New Patient  Peter Newby  12/17/1985 2/19/2025    This patient was referred by Felipe Gonzales DO for evaluation and consultation for RIGHT inguinal hernia.    Chief Complaint: RIGHT groin discomfort    History of Present Illness: Pt patient states that he was in his normal state of health until beginning of January, where he developed shingles,.  After this he developed axillary swelling which was determined to be secondary to lymph node enlargement as a result of his shingles.  During that evaluation, patient complained of right groin discomfort prompting ultrasound examination of the right groin.  The ultrasound revealed evidence of a fat-containing inguinal hernia.  Patient states that he did not have any groin discomfort until January when his daughter came and jumped on him.  He does not work in a physical job, and is not particularly physically active in his recreational life at this time.    Review of Systems:  Constitutional: No fevers or chills or recent weight loss, no recent sick contacts  Respiratory: Denies any shortness of breath, productive cough, dyspnea  Cardiovascular: No chest pain  Gastrointestinal: No nausea, vomiting, diarrhea, abdominal pain  Urologic: Denies any dysuria    History reviewed. No pertinent past medical history.    Past Surgical History:   Procedure Laterality Date    Vasectomy  02/04/2022    Dr. Lentz       Social History     Socioeconomic History    Marital status:      Spouse name: Not on file    Number of children: Not on file    Years of education: Not on file    Highest education level: Not on file   Occupational History    Not on file   Tobacco Use    Smoking status: Never    Smokeless tobacco: Never   Vaping Use    Vaping status: Never Used   Substance and Sexual Activity    Alcohol use: Yes     Comment: socially     Drug use: Never    Sexual activity: Not on file   Other Topics Concern     Service Not Asked    Blood Transfusions Not Asked     Caffeine Concern Yes     Comment: pop weekly, 2 grinds    Occupational Exposure Not Asked    Hobby Hazards Not Asked    Sleep Concern Not Asked    Stress Concern Not Asked    Weight Concern Not Asked    Special Diet Not Asked    Back Care Not Asked    Exercise Yes     Comment: walking daily     Bike Helmet Not Asked    Seat Belt Not Asked    Self-Exams Not Asked   Social History Narrative    Not on file     Social Drivers of Health     Food Insecurity: Not on file   Transportation Needs: Not on file   Stress: Not on file   Housing Stability: Not on file         Current Outpatient Medications:     Cholecalciferol (VITAMIN D3) 25 MCG (1000 UT) Oral Cap, Take 1 tablet by mouth daily., Disp: , Rfl:     acetaminophen 500 MG Oral Tab, Take 1 tablet (500 mg total) by mouth as needed for Pain., Disp: , Rfl:     Allergies[1]    Family History   Problem Relation Age of Onset    Other (ibs) Sister     Other (ibs) Sister        Objective/Physical Exam:  General: Alert, orientated x3.  Cooperative.  No apparent distress.  Vital Signs:  Blood pressure 118/82, pulse 70, temperature 97.5 °F (36.4 °C), temperature source Temporal, resp. rate 18, SpO2 97%. There is no height or weight on file to calculate BMI.  HEENT: NC/AT   Lungs: Even, unlabored  Cardiac: Regular rate and rhythm. No murmur.  Abdomen: Soft, ND/NT, no R/G, umbilical bulge at 12 o'clock position  Groin:   RIGHT: No bulge, (+) impulse  LEFT: No bulge, (+) impulse  Extremities:  SMAE  Skin: Warm & dry      Labs/Radiology:  ultrasound R groin reviewed, fat containing inguinal canal    Discussed:   The potential surgical and non-surgical treatment options were discussed with the patient.  The potential risks, benefits, complications outcomes/recovery and alternatives to any proposed surgery were also fully reviewed with the patient. Any proposed surgical procedure was described in detail.  Expected postoperative pain, recuperation and postoperative course was discussed.   The patient verbalizes understanding.  All questions from the patient were discussed in detail to the patient's satisfaction.  No other questions were presented at this time.         Assessment:      B/L inguinal hernia, punctate umbilical hernia    Plan:  Plan for MIS bilateral inguinal hernia repair with mesh, possible suture repair of umbilical hernia    Thank you for allowing me to participate in your patient's care.         Melchor Reyes MD    Please note that this report has been produced using speech recognition software and may contain errors related to that system including but not limited to errors in grammar, punctuation and spelling as well as words and phrases that possibly may have been recognized inappropriately.  If there are any questions or concerns please contact the dictating provider for clarification.        Date of Surgery:  TBD   DX:     ICD-10-CM   1. Bilateral inguinal hernia without obstruction or gangrene, recurrence not specified  K40.20   2. Umbilical hernia without obstruction and without gangrene  K42.9        Surgery Site :RIGHT / LEFT / BILATERAL: bilateral    [] Open  [] Laparoscopic  [] Robotic    [] Pilonidal Cystectomy  [] Excision of Lipomas     [] Sigmoidectomies    [] Hemorrhoidectomy   [] Transanal Hemorrhoidal Dearterialization [] Rectal Exam Under Anesthesia  [] Ureteral stent, Urologist  [] Hernia   [] Ventral  [x] Umbilical  [x] Inguinal  [] Incisional  [] Paraesophageal [] Component Separation (TAR)   [] Cholecystectomy  [] Appendectomy    [] Port placement      Anesthesia: ANESTHESIA TYPE: Gen    Location:  [] [facility]   [] [facility]  [] [facility]  [x] [facility]   [] [facility]    Admission Status: EDW OR ADMIT LOCATION: [facility]    SA Needed: Yes/No: Yes:         Clearance Needed:  []Medical Clearance   []Cardiac Clearance:   []Anticoagulation Management:    []Renal:   []Neuro:     Hx sleep apnea:   Yes/No: No      Pacemaker:  Yes/No: No      Latex allergies: No  evidence of latex-specific IgE     Pre-Admission Testing:  Surgeon's Personalized order Set.   Prophylactic Antibiotics Order: Prophylactic antibiotic protocol    Equipment:   [] C-Arm  Other:                 [1] No Known Allergies

## 2025-02-21 ENCOUNTER — TELEPHONE (OUTPATIENT)
Facility: LOCATION | Age: 40
End: 2025-02-21

## 2025-02-24 ENCOUNTER — TELEPHONE (OUTPATIENT)
Facility: LOCATION | Age: 40
End: 2025-02-24

## 2025-02-24 DIAGNOSIS — K40.20 BILATERAL INGUINAL HERNIA WITHOUT OBSTRUCTION OR GANGRENE, RECURRENCE NOT SPECIFIED: Primary | ICD-10-CM

## 2025-03-24 ENCOUNTER — TELEPHONE (OUTPATIENT)
Facility: LOCATION | Age: 40
End: 2025-03-24

## 2025-03-24 NOTE — TELEPHONE ENCOUNTER
NANDINI RICARDO Patient  Member ID  [id number]    Date of Birth  1985-12-17    Gender  Male    Relationship to Subscriber  Spouse    Subscriber Name  SINAI RICARDO    Transaction Type  Outpatient Authorization    Organization  [facility]    Payer  [facility] logo     Certificate Information  Reference Number  [id number]    Status  NO ACTION REQUIRED    Message  Requested Service does not require preauthorization. We would strongly encourage you to check benefits for this service.    Member Information  Patient Name  NANDINI RICARDO    Patient Date of Birth  1985-12-17    Patient Gender  Male    Member ID  [id number]    Relationship to Subscriber  Spouse    Subscriber Name  SINAI RICARDO    Requesting Provider     Name  ANATOLY ERNESTO    NPI  [id number]    Specialty  [id number]    Provider Role  Provider    Address  [address] [place] [address]    Phone  [phone]    Fax  [phone]    Contact Name  RAMESH CORBETT    Service Information  Service Type  2 - Surgical    Place of Service  22 - [facility]    Service From - To Date  2025-04-08 - 2025-06-27    Level of Service  Elective    Diagnosis Code 1   - Bi inguinal hernia w/o obst or gangrene not spcf as recur    Procedure Code 1 (CPT/HCPCS)  41899 - PRP I/OBDULIA INIT REDUC >5 YR    Quantity  1 Units    Status  NO ACTION REQUIRED    Procedure Code 2 (CPT/HCPCS)  63982 - RPR AA HRN 1ST < 3 CM RDC    Quantity  1 Units    Status  NO ACTION REQUIRED

## 2025-03-25 ENCOUNTER — APPOINTMENT (OUTPATIENT)
Dept: ADMINISTRATIVE | Facility: HOSPITAL | Age: 40
End: 2025-03-25
Payer: COMMERCIAL

## 2025-04-07 ENCOUNTER — ANESTHESIA EVENT (OUTPATIENT)
Dept: SURGERY | Facility: HOSPITAL | Age: 40
End: 2025-04-07
Payer: COMMERCIAL

## 2025-04-08 ENCOUNTER — ANESTHESIA (OUTPATIENT)
Dept: SURGERY | Facility: HOSPITAL | Age: 40
End: 2025-04-08
Payer: COMMERCIAL

## 2025-04-08 ENCOUNTER — HOSPITAL ENCOUNTER (OUTPATIENT)
Facility: HOSPITAL | Age: 40
Setting detail: HOSPITAL OUTPATIENT SURGERY
Discharge: HOME | End: 2025-04-08
Attending: SURGERY | Admitting: SURGERY
Payer: COMMERCIAL

## 2025-04-08 VITALS
HEART RATE: 65 BPM | DIASTOLIC BLOOD PRESSURE: 61 MMHG | OXYGEN SATURATION: 100 % | RESPIRATION RATE: 16 BRPM | HEIGHT: 68 IN | WEIGHT: 205 LBS | SYSTOLIC BLOOD PRESSURE: 131 MMHG | BODY MASS INDEX: 31.07 KG/M2 | TEMPERATURE: 98 F

## 2025-04-08 DIAGNOSIS — K40.20 BILATERAL INGUINAL HERNIA WITHOUT OBSTRUCTION OR GANGRENE, RECURRENCE NOT SPECIFIED: Primary | ICD-10-CM

## 2025-04-08 PROBLEM — K42.0 INCARCERATED UMBILICAL HERNIA: Status: ACTIVE | Noted: 2025-04-08

## 2025-04-08 PROBLEM — K40.00 INCARCERATED INGUINAL HERNIA, BILATERAL: Status: ACTIVE | Noted: 2025-04-08

## 2025-04-08 PROCEDURE — 0YUA4JZ SUPPLEMENT BILATERAL INGUINAL REGION WITH SYNTHETIC SUBSTITUTE, PERCUTANEOUS ENDOSCOPIC APPROACH: ICD-10-PCS | Performed by: SURGERY

## 2025-04-08 PROCEDURE — 49507 PRP I/HERN INIT BLOCK >5 YR: CPT

## 2025-04-08 PROCEDURE — 49592 RPR AA HRN 1ST < 3 NCR/STRN: CPT | Performed by: SURGERY

## 2025-04-08 PROCEDURE — 8E0W4CZ ROBOTIC ASSISTED PROCEDURE OF TRUNK REGION, PERCUTANEOUS ENDOSCOPIC APPROACH: ICD-10-PCS | Performed by: SURGERY

## 2025-04-08 PROCEDURE — 49507 PRP I/HERN INIT BLOCK >5 YR: CPT | Performed by: SURGERY

## 2025-04-08 PROCEDURE — 49592 RPR AA HRN 1ST < 3 NCR/STRN: CPT

## 2025-04-08 PROCEDURE — 0WQF4ZZ REPAIR ABDOMINAL WALL, PERCUTANEOUS ENDOSCOPIC APPROACH: ICD-10-PCS | Performed by: SURGERY

## 2025-04-08 DEVICE — 3DMAX MID ANATOMICAL MESH, 12 CM X 17 CM (5" X 7"), EXTRA LARGE, RIGHT
Type: IMPLANTABLE DEVICE | Site: INGUINAL | Status: FUNCTIONAL
Brand: 3DMAX

## 2025-04-08 DEVICE — 3DMAX MID ANATOMICAL MESH, 12 CM X 17 CM (5" X 7"), EXTRA LARGE, LEFT
Type: IMPLANTABLE DEVICE | Site: INGUINAL | Status: FUNCTIONAL
Brand: 3DMAX

## 2025-04-08 DEVICE — VISTASEAL FIBRIN SEAL 4 ML: Type: IMPLANTABLE DEVICE | Site: INGUINAL | Status: FUNCTIONAL

## 2025-04-08 RX ORDER — LABETALOL HYDROCHLORIDE 5 MG/ML
5 INJECTION, SOLUTION INTRAVENOUS EVERY 5 MIN PRN
Status: DISCONTINUED | OUTPATIENT
Start: 2025-04-08 | End: 2025-04-08

## 2025-04-08 RX ORDER — HYDROCODONE BITARTRATE AND ACETAMINOPHEN 10; 325 MG/1; MG/1
1 TABLET ORAL ONCE AS NEEDED
Status: DISCONTINUED | OUTPATIENT
Start: 2025-04-08 | End: 2025-04-08

## 2025-04-08 RX ORDER — NEOSTIGMINE METHYLSULFATE 1 MG/ML
INJECTION INTRAVENOUS AS NEEDED
Status: DISCONTINUED | OUTPATIENT
Start: 2025-04-08 | End: 2025-04-08 | Stop reason: SURG

## 2025-04-08 RX ORDER — MIDAZOLAM HYDROCHLORIDE 1 MG/ML
INJECTION INTRAMUSCULAR; INTRAVENOUS AS NEEDED
Status: DISCONTINUED | OUTPATIENT
Start: 2025-04-08 | End: 2025-04-08 | Stop reason: SURG

## 2025-04-08 RX ORDER — OXYCODONE HYDROCHLORIDE 5 MG/1
5 TABLET ORAL EVERY 4 HOURS PRN
Qty: 15 TABLET | Refills: 0 | Status: SHIPPED | OUTPATIENT
Start: 2025-04-08

## 2025-04-08 RX ORDER — MEPERIDINE HYDROCHLORIDE 25 MG/ML
12.5 INJECTION INTRAMUSCULAR; INTRAVENOUS; SUBCUTANEOUS AS NEEDED
Status: DISCONTINUED | OUTPATIENT
Start: 2025-04-08 | End: 2025-04-08

## 2025-04-08 RX ORDER — KETOROLAC TROMETHAMINE 30 MG/ML
INJECTION, SOLUTION INTRAMUSCULAR; INTRAVENOUS AS NEEDED
Status: DISCONTINUED | OUTPATIENT
Start: 2025-04-08 | End: 2025-04-08 | Stop reason: SURG

## 2025-04-08 RX ORDER — DIPHENHYDRAMINE HYDROCHLORIDE 50 MG/ML
12.5 INJECTION, SOLUTION INTRAMUSCULAR; INTRAVENOUS AS NEEDED
Status: DISCONTINUED | OUTPATIENT
Start: 2025-04-08 | End: 2025-04-08

## 2025-04-08 RX ORDER — NALOXONE HYDROCHLORIDE 0.4 MG/ML
80 INJECTION, SOLUTION INTRAMUSCULAR; INTRAVENOUS; SUBCUTANEOUS AS NEEDED
Status: DISCONTINUED | OUTPATIENT
Start: 2025-04-08 | End: 2025-04-08

## 2025-04-08 RX ORDER — HYDROMORPHONE HYDROCHLORIDE 1 MG/ML
0.2 INJECTION, SOLUTION INTRAMUSCULAR; INTRAVENOUS; SUBCUTANEOUS EVERY 5 MIN PRN
Status: DISCONTINUED | OUTPATIENT
Start: 2025-04-08 | End: 2025-04-08

## 2025-04-08 RX ORDER — LIDOCAINE HYDROCHLORIDE 40 MG/ML
SOLUTION TOPICAL AS NEEDED
Status: DISCONTINUED | OUTPATIENT
Start: 2025-04-08 | End: 2025-04-08 | Stop reason: SURG

## 2025-04-08 RX ORDER — SCOPOLAMINE 1 MG/3D
1 PATCH, EXTENDED RELEASE TRANSDERMAL ONCE
Status: DISCONTINUED | OUTPATIENT
Start: 2025-04-08 | End: 2025-04-08 | Stop reason: HOSPADM

## 2025-04-08 RX ORDER — ACETAMINOPHEN 500 MG
1000 TABLET ORAL ONCE AS NEEDED
Status: DISCONTINUED | OUTPATIENT
Start: 2025-04-08 | End: 2025-04-08

## 2025-04-08 RX ORDER — PROCHLORPERAZINE EDISYLATE 5 MG/ML
5 INJECTION INTRAMUSCULAR; INTRAVENOUS EVERY 8 HOURS PRN
Status: DISCONTINUED | OUTPATIENT
Start: 2025-04-08 | End: 2025-04-08

## 2025-04-08 RX ORDER — GLYCOPYRROLATE 0.2 MG/ML
INJECTION, SOLUTION INTRAMUSCULAR; INTRAVENOUS AS NEEDED
Status: DISCONTINUED | OUTPATIENT
Start: 2025-04-08 | End: 2025-04-08 | Stop reason: SURG

## 2025-04-08 RX ORDER — ROCURONIUM BROMIDE 10 MG/ML
INJECTION, SOLUTION INTRAVENOUS AS NEEDED
Status: DISCONTINUED | OUTPATIENT
Start: 2025-04-08 | End: 2025-04-08 | Stop reason: SURG

## 2025-04-08 RX ORDER — HYDROMORPHONE HYDROCHLORIDE 1 MG/ML
0.6 INJECTION, SOLUTION INTRAMUSCULAR; INTRAVENOUS; SUBCUTANEOUS EVERY 5 MIN PRN
Status: DISCONTINUED | OUTPATIENT
Start: 2025-04-08 | End: 2025-04-08

## 2025-04-08 RX ORDER — DEXAMETHASONE SODIUM PHOSPHATE 4 MG/ML
VIAL (ML) INJECTION AS NEEDED
Status: DISCONTINUED | OUTPATIENT
Start: 2025-04-08 | End: 2025-04-08 | Stop reason: SURG

## 2025-04-08 RX ORDER — ALBUTEROL SULFATE 0.83 MG/ML
2.5 SOLUTION RESPIRATORY (INHALATION) AS NEEDED
Status: DISCONTINUED | OUTPATIENT
Start: 2025-04-08 | End: 2025-04-08

## 2025-04-08 RX ORDER — SODIUM CHLORIDE, SODIUM LACTATE, POTASSIUM CHLORIDE, CALCIUM CHLORIDE 600; 310; 30; 20 MG/100ML; MG/100ML; MG/100ML; MG/100ML
INJECTION, SOLUTION INTRAVENOUS CONTINUOUS
Status: DISCONTINUED | OUTPATIENT
Start: 2025-04-08 | End: 2025-04-08

## 2025-04-08 RX ORDER — HEPARIN SODIUM 5000 [USP'U]/ML
5000 INJECTION, SOLUTION INTRAVENOUS; SUBCUTANEOUS ONCE
Status: COMPLETED | OUTPATIENT
Start: 2025-04-08 | End: 2025-04-08

## 2025-04-08 RX ORDER — ACETAMINOPHEN 500 MG
1000 TABLET ORAL ONCE
Status: DISCONTINUED | OUTPATIENT
Start: 2025-04-08 | End: 2025-04-08 | Stop reason: HOSPADM

## 2025-04-08 RX ORDER — MIDAZOLAM HYDROCHLORIDE 1 MG/ML
1 INJECTION INTRAMUSCULAR; INTRAVENOUS EVERY 5 MIN PRN
Status: DISCONTINUED | OUTPATIENT
Start: 2025-04-08 | End: 2025-04-08

## 2025-04-08 RX ORDER — ONDANSETRON 2 MG/ML
4 INJECTION INTRAMUSCULAR; INTRAVENOUS EVERY 6 HOURS PRN
Status: DISCONTINUED | OUTPATIENT
Start: 2025-04-08 | End: 2025-04-08

## 2025-04-08 RX ORDER — BUPIVACAINE HYDROCHLORIDE 2.5 MG/ML
INJECTION, SOLUTION EPIDURAL; INFILTRATION; INTRACAUDAL; PERINEURAL AS NEEDED
Status: DISCONTINUED | OUTPATIENT
Start: 2025-04-08 | End: 2025-04-08 | Stop reason: HOSPADM

## 2025-04-08 RX ORDER — LIDOCAINE HYDROCHLORIDE 10 MG/ML
INJECTION, SOLUTION EPIDURAL; INFILTRATION; INTRACAUDAL; PERINEURAL AS NEEDED
Status: DISCONTINUED | OUTPATIENT
Start: 2025-04-08 | End: 2025-04-08 | Stop reason: SURG

## 2025-04-08 RX ORDER — ONDANSETRON 2 MG/ML
INJECTION INTRAMUSCULAR; INTRAVENOUS AS NEEDED
Status: DISCONTINUED | OUTPATIENT
Start: 2025-04-08 | End: 2025-04-08 | Stop reason: SURG

## 2025-04-08 RX ORDER — HYDROMORPHONE HYDROCHLORIDE 1 MG/ML
0.4 INJECTION, SOLUTION INTRAMUSCULAR; INTRAVENOUS; SUBCUTANEOUS EVERY 5 MIN PRN
Status: DISCONTINUED | OUTPATIENT
Start: 2025-04-08 | End: 2025-04-08

## 2025-04-08 RX ORDER — HYDROCODONE BITARTRATE AND ACETAMINOPHEN 10; 325 MG/1; MG/1
2 TABLET ORAL ONCE AS NEEDED
Status: DISCONTINUED | OUTPATIENT
Start: 2025-04-08 | End: 2025-04-08

## 2025-04-08 RX ADMIN — ROCURONIUM BROMIDE 10 MG: 10 INJECTION, SOLUTION INTRAVENOUS at 11:47:00

## 2025-04-08 RX ADMIN — SODIUM CHLORIDE, SODIUM LACTATE, POTASSIUM CHLORIDE, CALCIUM CHLORIDE: 600; 310; 30; 20 INJECTION, SOLUTION INTRAVENOUS at 13:26:00

## 2025-04-08 RX ADMIN — GLYCOPYRROLATE 0.4 MG: 0.2 INJECTION, SOLUTION INTRAMUSCULAR; INTRAVENOUS at 13:25:00

## 2025-04-08 RX ADMIN — NEOSTIGMINE METHYLSULFATE 3 MG: 1 INJECTION INTRAVENOUS at 13:25:00

## 2025-04-08 RX ADMIN — MIDAZOLAM HYDROCHLORIDE 2 MG: 1 INJECTION INTRAMUSCULAR; INTRAVENOUS at 10:46:00

## 2025-04-08 RX ADMIN — ROCURONIUM BROMIDE 10 MG: 10 INJECTION, SOLUTION INTRAVENOUS at 12:35:00

## 2025-04-08 RX ADMIN — KETOROLAC TROMETHAMINE 30 MG: 30 INJECTION, SOLUTION INTRAMUSCULAR; INTRAVENOUS at 13:20:00

## 2025-04-08 RX ADMIN — SODIUM CHLORIDE, SODIUM LACTATE, POTASSIUM CHLORIDE, CALCIUM CHLORIDE: 600; 310; 30; 20 INJECTION, SOLUTION INTRAVENOUS at 10:46:00

## 2025-04-08 RX ADMIN — LIDOCAINE HYDROCHLORIDE 50 MG: 10 INJECTION, SOLUTION EPIDURAL; INFILTRATION; INTRACAUDAL; PERINEURAL at 10:49:00

## 2025-04-08 RX ADMIN — DEXAMETHASONE SODIUM PHOSPHATE 8 MG: 4 MG/ML VIAL (ML) INJECTION at 11:00:00

## 2025-04-08 RX ADMIN — LIDOCAINE HYDROCHLORIDE 4 ML: 40 SOLUTION TOPICAL at 10:51:00

## 2025-04-08 RX ADMIN — ONDANSETRON 4 MG: 2 INJECTION INTRAMUSCULAR; INTRAVENOUS at 13:25:00

## 2025-04-08 NOTE — ANESTHESIA PREPROCEDURE EVALUATION
PRE-OP EVALUATION    Patient Name: Peter Newby    Admit Diagnosis: Bilateral inguinal hernia without obstruction or gangrene, recurrence not specified [K40.20]    Pre-op Diagnosis: Bilateral inguinal hernia without obstruction or gangrene, recurrence not specified [K40.20]    ROBOTIC BILATERAL INGUINAL HERNIA REPAIR AND UMBILICAL HERNIA REPAIR    Anesthesia Procedure: ROBOTIC BILATERAL INGUINAL HERNIA REPAIR AND UMBILICAL HERNIA REPAIR (Bilateral)    Surgeons and Role:     * Melchor Reyes MD - Primary    Pre-op vitals reviewed.  Temp: 97.6 °F (36.4 °C)  Pulse: 50  Resp: 16  BP: 121/83  SpO2: 100 %  Body mass index is 31.17 kg/m².    Current medications reviewed.  Hospital Medications:   [Transfer Hold] acetaminophen (Tylenol Extra Strength) tab 1,000 mg  1,000 mg Oral Once    [Transfer Hold] scopolamine (Transderm-Scop) 1 MG/3DAYS patch 1 patch  1 patch Transdermal Once    lactated ringers infusion   Intravenous Continuous    [COMPLETED] heparin (Porcine) 5000 UNIT/ML injection 5,000 Units  5,000 Units Subcutaneous Once    ceFAZolin (Ancef) 2g in 10mL IV syringe premix  2 g Intravenous Once       Outpatient Medications:   Prescriptions Prior to Admission[1]    Allergies: Patient has no known allergies.      Anesthesia Evaluation    Patient summary reviewed.    Anesthetic Complications  (-) history of anesthetic complications         GI/Hepatic/Renal    Negative GI/hepatic/renal ROS.                             Cardiovascular    Negative cardiovascular ROS.    Exercise tolerance: good     MET: >4                             (-) angina              Endo/Other    Negative endo/other ROS.                              Pulmonary    Negative pulmonary ROS.           (-) shortness of breath            Neuro/Psych    Negative neuro/psych ROS.                                  Past Surgical History:   Procedure Laterality Date    Vasectomy  02/04/2022    Dr. Lentz     Social History     Socioeconomic History    Marital  status:    Tobacco Use    Smoking status: Some Days     Types: Cigars    Smokeless tobacco: Never    Tobacco comments:     Cigars 3-4 times per year   Vaping Use    Vaping status: Never Used   Substance and Sexual Activity    Alcohol use: Yes     Comment: socially     Drug use: Never   Other Topics Concern    Caffeine Concern Yes     Comment: pop weekly, 2 grinds    Exercise Yes     Comment: walking daily      History   Drug Use Unknown     Available pre-op labs reviewed.               Airway      Mallampati: I  Mouth opening: 3 FB  TM distance: 4 - 6 cm  Neck ROM: full Cardiovascular      Rhythm: regular  Rate: normal     Dental             Pulmonary      Breath sounds clear to auscultation bilaterally.               Other findings              ASA: 1   Plan: general  NPO status verified and patient meets guidelines.    Post-procedure pain management plan discussed with surgeon and patient.      Plan/risks discussed with: spouse and patient                Present on Admission:  **None**             [1]   Medications Prior to Admission   Medication Sig Dispense Refill Last Dose/Taking    acetaminophen 500 MG Oral Tab Take 1 tablet (500 mg total) by mouth as needed for Pain.   2025 at  5:15 AM    [] valACYclovir 1 G Oral Tab Take 1 tablet (1,000 mg total) by mouth every 8 (eight) hours for 7 days. 21 tablet 0     Cholecalciferol (VITAMIN D3) 25 MCG (1000 UT) Oral Cap Take 1 tablet by mouth daily.   More than a month

## 2025-04-08 NOTE — DISCHARGE INSTRUCTIONS
Home Care Instructions  Laparoscopic Inguinal Hernia  Dr Melchor Reyes    MEDICATIONS  Take 2 Extra Strength Tylenol (1000mg every) 8 hours for pain. For the first 3 days it is best to take the Tylenol every 8 hours even if you do not feel much pain.   You can also take NSAID (Advil or Alleve):  Advil (ibuprofen) 800mg every 8 hours or Alleve (naproxen) 500mg every 12 hours for pain. For the first 3 days it is best to take the NSAID around the clock even if you do not feel much pain.   For pain that is not controlled with the above, take half to one Oxycodone pill (2.5-5mg) every six hours as needed for pain. If you do not feel that narcotics are necessary you shouldn’t take them.   Some residual discomfort is not uncommon, the goal is not absence of discomfort but treating pain such that you can resume your activities.   Please ask your surgeon before resuming blood thinners such as Aspirin, Plavix, Coumadin, Warfarin, Eliquis, or Xarelto. All other home medications may be resumed as scheduled.     DIET  The patient may resume a general diet immediately.  This is not a good time to eat excessively. The patient should eat in moderation and stick with foods the patient feels are easy to digest.  There should be no alcohol consumption in the immediate recover time period or within six hours of taking narcotics.    WOUND CARE  Do not remove the steri-strips or butterfly tapes that are white and adherent to the skin.  The steri-strips will eventually peel up at the ends and at this point they may be removed.  This is usually seven to ten days after surgery.  The patient may shower the day after surgery.  There is no need to cover the incisions and all top gauze type dressing should be removed prior to showering.  Soap can get on the wounds but do not scrub over the wounds.  No hair dye or chemicals of any kind should get in the wounds.  Avoid tub baths for two weeks.  If visible sutures or staples are present they will be  removed in the office by the surgeon or nurse.  Most wounds will be closed with dissolving suture underneath the skin.  These sutures will dissolve on their own.    ACTIVITY  Every day the patient should be up, showered and dressed.  Each day the patient should be up and around the house.  The patient should not lie in bed and should not stay in pajamas.  We count on the patient being up, coughing, walking and deep breathing to avoid pneumonia and blood clots in the legs.  Once a day the patient should get out of the house and go shopping, go to the mall, the [employer] store, the [employer] or a restaurant.  The patient may ride in a car but should not drive the car for at least one week.  Patients should be off narcotics for at least 8 hours prior to being the .  The average time off work is 10 to 14 days; most adults will be seeing the surgeon prior to returning to work.  Patients may go up and down stairs and lift up to five pounds but no bending, pushing or pulling.  Nothing called work or exercise until the follow up visit.  No ‘stair-master’ power walking, jogging or workout until the follow up visit.  Patients should seek further activity limits at the time of their appointment.  If the patient is unable to urinate and feels a painful and full bladder call us immediately.    APPOINTMENT  Please call our office today for an appointment within five to ten days of discharge.  Any fever greater than 100.5, chills, nausea, vomiting, or severe diarrhea, please call our office.  If the wound turns red, hot, swollen, becomes increasingly painful, or drains pus call us immediately at [phone].  For life threatening emergencies call 911.  For non-emergent care please call our office after 8:30 a.m. Monday through Friday.  The number listed above is our office number, and our phone automatically switches to our answering service if we are not there.  Please do not use the emergency room for nonurgent  care.    Thank you for entrusting us with your care.  [facility]--General Surgery     You received a drug called Toradol which is an Anti Inflammatory at: 120pm  If you are allowed to take Anti inflammatories:    Do not take any Anti Inflammatory like Motrin, Aleve or Ibuprophen until after: 720pm  Please report any suspected allergic reactions or bleeding issues to your doctor

## 2025-04-08 NOTE — ANESTHESIA PROCEDURE NOTES
Airway  Date/Time: 4/8/2025 10:51 AM  Urgency: elective      General Information and Staff    Patient location during procedure: OR  Anesthesiologist: Abel Otero MD  Performed: anesthesiologist   Performed by: Abel Otero MD  Authorized by: Abel Otero MD      Indications and Patient Condition  Indications for airway management: anesthesia  Sedation level: deep  Preoxygenated: yes  Patient position: sniffing  Mask difficulty assessment: 2 - vent by mask + OA or adjuvant +/- NMBA    Final Airway Details  Final airway type: endotracheal airway      Successful airway: ETT  Cuffed: yes   Successful intubation technique: direct laryngoscopy  Endotracheal tube insertion site: oral  Blade: Rancho  Blade size: #3  ETT size (mm): 7.5    Cormack-Lehane Classification: grade I - full view of glottis  Placement verified by: capnometry   Measured from: lips  ETT to lips (cm): 21  Number of attempts at approach: 1

## 2025-04-08 NOTE — ANESTHESIA POSTPROCEDURE EVALUATION
[facility]    Peter Newby Patient Status:  Hospital Outpatient Surgery   Age/Gender 39 year old male MRN [id number]   Location [facility] SURGERY Attending Melchor Reyes MD   Hosp Day # 0 PCP Felipe Gonzales DO       Anesthesia Post-op Note    ROBOTIC BILATERAL INGUINAL HERNIA REPAIR AND UMBILICAL HERNIA REPAIR    Procedure Summary       Date: 04/08/25 Room / Location:  MAIN OR 09 / [facility] MAIN OR    Anesthesia Start: 1046 Anesthesia Stop:     Procedure: ROBOTIC BILATERAL INGUINAL HERNIA REPAIR AND UMBILICAL HERNIA REPAIR (Bilateral: Abdomen) Diagnosis:       Bilateral inguinal hernia without obstruction or gangrene, recurrence not specified      (Bilateral inguinal hernia without obstruction or gangrene, recurrence not specified [K40.20])    Surgeons: Melchor Reyes MD Anesthesiologist: Abel Otero MD    Anesthesia Type: general ASA Status: 2            Anesthesia Type: general    Vitals Value Taken Time   /77 04/08/25 1333   Temp 97.0 degrees F 04/08/25 1333   Pulse 62 04/08/25 1333   Resp 12 04/08/25 1333   SpO2 95% 04/08/25 1333           Patient Location: PACU    Anesthesia Type: general    Airway Patency: patent and extubated    Postop Pain Control: adequate    Mental Status: mildly sedated but able to meaningfully participate in the post-anesthesia evaluation    Nausea/Vomiting: none    Cardiopulmonary/Hydration status: stable euvolemic    Complications: no apparent anesthesia related complications    Postop vital signs: stable    Dental Exam: Unchanged from Preop    Patient to be discharged from PACU when criteria met.

## 2025-04-08 NOTE — H&P
History and Physical     Peter Newby Patient Status:  [facility] Outpatient Surgery    1985 MRN [id number]   Location [facility] PRE OP HOLDING Attending Melchor Reyes MD   Hosp Day # 0 PCP Felipe Gonzales DO     Chief Complaint: R groin discomfort    Subjective:    Peter Newby is a 39 year old male with imaging evidence of RIH, clinical evidence of LIH & known UH.     History/Other:      Past Medical History:History reviewed. No pertinent past medical history.     Past Surgical History:   Past Surgical History:   Procedure Laterality Date    Vasectomy  2022    Dr. Lentz       Social History:  reports that he has been smoking cigars. He has never used smokeless tobacco. He reports current alcohol use. He reports that he does not use drugs.    Family History:   Family History   Problem Relation Age of Onset    Other (ibs) Sister     Other (ibs) Sister        Allergies: Allergies[1]    Medications:  Medications Ordered Prior to Encounter[2]    Review of Systems:   A comprehensive 14 point review of systems was completed.    Pertinent positives and negatives noted in the HPI.    Objective: .  General: Alert, orientated x3.  Cooperative.  No apparent distress.  Vital Signs:  Blood pressure 121/83, pulse 50, temperature 97.6 °F (36.4 °C), temperature source Temporal, resp. rate 16, height 68\", weight 205 lb (93 kg), SpO2 100%. Body mass index is 31.17 kg/m².  HEENT: NC/AT   Lungs: Even, unlabored  Cardiac: Regular rate and rhythm. No murmur.  Abdomen: Soft, ND/NT, no R/G, umbilical bulge  Extremities:  SMAE  Skin: Warm & dry      Imaging: Imaging data reviewed in Epic.    Assessment & Plan:    #Planfor robotic bilateral inguinal hernia repair with mesh, primary UHR.    Quality:  DVT Mechanical Prophylaxis:        DVT Pharmacologic Prophylaxis   Medication   None                Code Status: Not on file  Phelps: No urinary catheter in place  Phelps Duration (in days):   Central line:    FELIX:      Plan of care discussed with Pt and spouse    Melchor Reyes MD  4/8/2025    Supplementary Documentation:                                      [1] No Known Allergies  [2]   No current facility-administered medications on file prior to encounter.     Current Outpatient Medications on File Prior to Encounter   Medication Sig Dispense Refill    acetaminophen 500 MG Oral Tab Take 1 tablet (500 mg total) by mouth as needed for Pain.      Cholecalciferol (VITAMIN D3) 25 MCG (1000 UT) Oral Cap Take 1 tablet by mouth daily.

## 2025-04-08 NOTE — OPERATIVE REPORT
[facility]   part of formerly [facility]    Operative Note         Peter Newby Location: OR   [facility] [id number] MRN [id number]   Admission Date 4/8/2025 Operation Date 4/8/2025   Attending Physician Melchor Reyes MD       Patient Name: Peter Newby     Preoperative Diagnosis: Bilateral inguinal hernia without obstruction or gangrene, recurrence not specified [K40.20]     Postoperative Diagnosis: Bilateral inguinal hernia without obstruction or gangrene, recurrence not specified [K40.20]     Procedure(s):  ROBOTIC BILATERAL INGUINAL HERNIA REPAIR AND UMBILICAL HERNIA REPAIR     Primary Surgeon: Melchor Reyes MD     PA: Chelo Desir PA-C     Anesthesia: General     Specimen: * No specimens in log *     Estimated Blood Loss: Blood Output: 10 mL (4/8/2025  1:16 PM)       Complications: none      Indications for procedure: Symptomatic right inguinal hernia, occult left inguinal hernia, asymptomatic umbilical hernia    Surgical Findings:   R: Indirect: 2cm Direct: -  Femoral: -  L: Indirect: 2cm Direct: -  Femoral: -  Umbilical: 1cm M3 zone defect    Operative Summary:    The patient was brought to the operating room, and, after the induction of general endotracheal anesthesia, the abdomen was prepped and draped in the usual sterile fashion. A robotic port was planned in the upper midline through the umbilical defect. Optical entry was performed using a 5mm 0 degree laparoscope & optical trocar in the LUQ. The abdomen was insufflated under direct visualization. Additional ports were placed in the standard position, cephalad & to the RIGHT. A supraumbilical incision was made & dissection was carried to the level of the fascia. The herniated preperitoneal fat was grasped with a hemostat & dissected free from the fascia, this was then amputated using electrocautery.  On peritoneoscopy, no significant adhesions were noted.  The patient was positioned in Trendelenburg position.   A peritoneal incision was made & a flap  was propagated across the midline from the right to the left. Dissection was continued to the MPO bilaterally, the flap was continued across midline, exposing the pubic symphasis. A RIGHT 2cm indirect hernia, the herniated cord lipoma was reduced using traction & electrocautery. Similar dissection was completd on the LEFT side, demonstrating a 2cm indirect hernia with cord lipoma.   The vas deferens was parietalized & the spermatic cord was dissected free from the flap. A XL Bard 3D Max MID mesh was introduced bilaterally and oriented in a standing position, with reinspection of the degree of flap dissection. The inferior mesh border was fixated using Vistaseal fibrin sealant and the mesh was anchored to the conjoint tendon and laterally using 2-0 Vicryl suture & to the midline through both mesh. The flap was raised & secured using 2-0 barbed suture.  The umbilical port was removed & the hernia was closed using 0 Prolene suture in a figure of eight fashion. Ports were removed under direct visualization and noted to be hemostatic.  Skin incisions were reapproximated using subcuticular 4-0 Monocryl suture.  Steri-Strips were used as dressing.  The umbilicus was packed with gauze & a suction dressing was applied.   The patient was awakened from anesthesia and brought to the recovery room in stable condition, having tolerated the procedure without apparent complication.  Operative findings were discussed with the patient's family immediately upon conclusion of the procedure.         Implants:   Implant Name Type Inv. Item Serial No.  Lot No. LRB No. Used Action   MESH OBDULIA XL [id number] R INGUINAL MFIL POLYPR - [id number]  MESH OBDULIA XL [id number] R INGUINAL MFIL POLYPR [id number] [employer]  [id number] Right 1 Implanted   MESH OBDULIA XL 5X7IN L MFIL POLYPR LUISA - [id number]  MESH OBDULIA XL 5X7IN L MFIL POLYPR LUISA [id number] [employer]  [id number] Left 1 Implanted   VISTASEAL FIBRIN SEAL 4 ML - [id number]   VISTASEAL FIBRIN SEAL 4 ML [id number] [employer]  [id number] Left 1 Implanted   VISTASEAL FIBRIN SEAL 4 ML - [id number]  VISTASEAL FIBRIN SEAL 4 ML [id number] [employer] WD [id number] Left 1 Implanted        Drains: None     Condition: Stable       Melchor Reyes MD

## 2025-04-10 ENCOUNTER — TELEPHONE (OUTPATIENT)
Facility: LOCATION | Age: 40
End: 2025-04-10

## 2025-04-10 NOTE — TELEPHONE ENCOUNTER
The patient have been experiencing chills and pain of the left leg, and sharp pain near incision site. The patient have some concerns and would like a phone call to discuss further steps.    Call back # [phone]

## 2025-04-10 NOTE — TELEPHONE ENCOUNTER
4/8 ROBOTIC BILATERAL INGUINAL HERNIA REPAIR AND UMBILICAL HERNIA REPAIR w/VVA    Spoke with patient, who c/o some intermittent chills, no fever. No N/V  C/o dull pain in knee to groin in Left leg, comes and goes, 3/10, no redness/swelling/warmth/streaks.  C/o intermittent shooting pain around incision, does not radiate.  States he took Oxycodone to sleep last evening, otherwise has been taking some Tylenol, occasional ice packs.   States he has been ambulating as able.    Relayed to patient that stated pain is normal recovery.  Advised Tylenol 1000mg and ibuprofen 400-600mg staggered q8hrs, Ice packs 20 mins on/off for comfort, ambulating as tolerated, adequate rest, increased fluids, and to continue to monitor and CB as needed.  He verbalized understanding, is agreeable, and had no further questions or concerns.

## 2025-04-18 ENCOUNTER — OFFICE VISIT (OUTPATIENT)
Facility: LOCATION | Age: 40
End: 2025-04-18
Payer: COMMERCIAL

## 2025-04-18 VITALS
DIASTOLIC BLOOD PRESSURE: 68 MMHG | SYSTOLIC BLOOD PRESSURE: 115 MMHG | TEMPERATURE: 98 F | HEART RATE: 67 BPM | OXYGEN SATURATION: 99 %

## 2025-04-18 DIAGNOSIS — Z98.890 POST-OPERATIVE STATE: Primary | ICD-10-CM

## 2025-04-18 PROBLEM — K42.0 INCARCERATED UMBILICAL HERNIA: Status: RESOLVED | Noted: 2025-04-08 | Resolved: 2025-04-18

## 2025-04-18 PROBLEM — K40.00 INCARCERATED INGUINAL HERNIA, BILATERAL: Status: RESOLVED | Noted: 2025-04-08 | Resolved: 2025-04-18

## 2025-04-18 PROCEDURE — 3074F SYST BP LT 130 MM HG: CPT | Performed by: PHYSICIAN ASSISTANT

## 2025-04-18 PROCEDURE — 99024 POSTOP FOLLOW-UP VISIT: CPT | Performed by: PHYSICIAN ASSISTANT

## 2025-04-18 PROCEDURE — 3078F DIAST BP <80 MM HG: CPT | Performed by: PHYSICIAN ASSISTANT

## 2025-04-18 NOTE — PROGRESS NOTES
Post Operative Visit Note       Active Problems  1. Post-operative state         Chief Complaint   Chief Complaint   Patient presents with    Post-Op     PO - ROBOTIC BILATERAL INGUINAL HERNIA REPAIR AND UMBILICAL HERNIA REPAIR w/ VVA 4/8. Pt is doing well, thinks he is improving every day. Pt reports some discomfort and tenderness to both sides about 2-3 in below his incisions, but slightly worse on the R side. Pt reports some swelling and bruising to his groin. Pt also reports some pain after urinating. Pt denies any other symptoms.              History of Present Illness   The patient presents today for postoperative visit following robotic bilateral inguinal hernia repair and umbilical hernia repair on 4/8/2025 by Dr. Reyes.    Patient states he is overall doing well since surgery.  He reports incisional pain that is improving daily.  He reports increased pain with sneezing.  He reports swelling and bruising of the testicle area that is improving..  He denies nausea or vomiting.  He is tolerating diet well.    Allergies  Peter has no known allergies.    Past Medical / Surgical / Social / Family History    The past medical and past surgical history have been reviewed by me today.     Past Medical History:    Incarcerated inguinal hernia, bilateral     Past Surgical History:   Procedure Laterality Date    Hernia surgery  4/8/2025    Other surgical history  [place] teeth removed    Vasectomy  02/04/2022    Dr. Lentz       The family history and social history have been reviewed by me today.    Family History   Problem Relation Age of Onset    Other (ibs) Sister     Other (ibs) Sister     Breast Cancer Maternal Grandmother     Cancer Maternal Grandmother     Heart Disease Paternal Grandfather     Diabetes Paternal Grandfather     Hypertension Paternal Grandfather      Social History     Socioeconomic History    Marital status:    Tobacco Use    Smoking status: Some Days     Types: Cigars    Smokeless  tobacco: Never    Tobacco comments:     Cigars 3-4 times per year   Vaping Use    Vaping status: Never Used   Substance and Sexual Activity    Alcohol use: Yes     Alcohol/week: 2.0 standard drinks of alcohol     Types: 2 Cans of beer per week     Comment: socially     Drug use: Never   Other Topics Concern    Caffeine Concern Yes     Comment: pop weekly, 2 grinds    Exercise Yes     Comment: walking daily         Current Outpatient Medications:     Cholecalciferol (VITAMIN D3) 25 MCG (1000 UT) Oral Cap, Take 1 tablet by mouth in the morning., Disp: , Rfl:     acetaminophen 500 MG Oral Tab, Take 1 tablet (500 mg total) by mouth as needed for Pain., Disp: , Rfl:     oxyCODONE 5 MG Oral Tab, Take 1 tablet (5 mg total) by mouth every 4 (four) hours as needed for Pain. (Patient not taking: Reported on 4/18/2025), Disp: 15 tablet, Rfl: 0      Review of Systems  A 10 point Review of Systems has been completed by me today and is negative except as above in the HPI.    Physical Findings   /68 (BP Location: Left arm, Patient Position: Sitting, Cuff Size: adult)   Pulse 67   Temp 98.4 °F (36.9 °C) (Temporal)   SpO2 99%   Gen/psych: alert and oriented, cooperative, no apparent distress  Cardiovascular: regular rate  Respiratory: respirations unlabored, no wheeze  Abdominal: soft, non-tender, non-distended, no guarding/rebound  Incisions: Incisions are healing well.  There is no erythema or drainage noted.         Assessment/Plan  1. Post-operative state        The patient is to continue with diet as tolerated.  The patient is to continue with lifting restrictions of no more than 20 pounds for 6 weeks from the date of his surgery.  All questions or concerns were answered.  He is to return to the clinic as needed.         No orders of the defined types were placed in this encounter.       Imaging & Referrals   None    Follow Up  Return if symptoms worsen or fail to improve.    Samantha Gaines PA-C  [facility] General  Surgery  4/18/2025  2:42 PM

## 2025-06-24 ENCOUNTER — OFFICE VISIT (OUTPATIENT)
Dept: FAMILY MEDICINE CLINIC | Facility: CLINIC | Age: 40
End: 2025-06-24
Payer: COMMERCIAL

## 2025-06-24 ENCOUNTER — LAB ENCOUNTER (OUTPATIENT)
Dept: LAB | Age: 40
End: 2025-06-24
Attending: FAMILY MEDICINE
Payer: COMMERCIAL

## 2025-06-24 VITALS
BODY MASS INDEX: 31.37 KG/M2 | HEART RATE: 64 BPM | SYSTOLIC BLOOD PRESSURE: 116 MMHG | WEIGHT: 207 LBS | HEIGHT: 68 IN | DIASTOLIC BLOOD PRESSURE: 74 MMHG | RESPIRATION RATE: 20 BRPM | OXYGEN SATURATION: 98 %

## 2025-06-24 DIAGNOSIS — Z00.00 ANNUAL PHYSICAL EXAM: Primary | ICD-10-CM

## 2025-06-24 DIAGNOSIS — G47.33 OSA (OBSTRUCTIVE SLEEP APNEA): ICD-10-CM

## 2025-06-24 DIAGNOSIS — E55.9 VITAMIN D DEFICIENCY: ICD-10-CM

## 2025-06-24 DIAGNOSIS — Z00.00 ANNUAL PHYSICAL EXAM: ICD-10-CM

## 2025-06-24 LAB
ALBUMIN SERPL-MCNC: 4.6 G/DL (ref 3.2–4.8)
ALBUMIN/GLOB SERPL: 1.9 {RATIO} (ref 1–2)
ALP LIVER SERPL-CCNC: 81 U/L (ref 45–117)
ALT SERPL-CCNC: 28 U/L (ref 10–49)
ANION GAP SERPL CALC-SCNC: 10 MMOL/L (ref 0–18)
AST SERPL-CCNC: 29 U/L (ref ?–34)
BASOPHILS # BLD AUTO: 0.03 X10(3) UL (ref 0–0.2)
BASOPHILS NFR BLD AUTO: 0.5 %
BILIRUB SERPL-MCNC: 0.6 MG/DL (ref 0.3–1.2)
BUN BLD-MCNC: 18 MG/DL (ref 9–23)
CALCIUM BLD-MCNC: 9.4 MG/DL (ref 8.7–10.6)
CHLORIDE SERPL-SCNC: 107 MMOL/L (ref 98–112)
CHOLEST SERPL-MCNC: 143 MG/DL (ref ?–200)
CO2 SERPL-SCNC: 27 MMOL/L (ref 21–32)
CREAT BLD-MCNC: 1.06 MG/DL (ref 0.7–1.3)
EGFRCR SERPLBLD CKD-EPI 2021: 90 ML/MIN/1.73M2 (ref 60–?)
EOSINOPHIL # BLD AUTO: 0.13 X10(3) UL (ref 0–0.7)
EOSINOPHIL NFR BLD AUTO: 2.1 %
ERYTHROCYTE [DISTWIDTH] IN BLOOD BY AUTOMATED COUNT: 12.1 %
FASTING PATIENT LIPID ANSWER: YES
FASTING STATUS PATIENT QL REPORTED: YES
GLOBULIN PLAS-MCNC: 2.4 G/DL (ref 2–3.5)
GLUCOSE BLD-MCNC: 97 MG/DL (ref 70–99)
HCT VFR BLD AUTO: 44.2 % (ref 39–53)
HDLC SERPL-MCNC: 54 MG/DL (ref 40–59)
HGB BLD-MCNC: 14.7 G/DL (ref 13–17.5)
IMM GRANULOCYTES # BLD AUTO: 0.01 X10(3) UL (ref 0–1)
IMM GRANULOCYTES NFR BLD: 0.2 %
LDLC SERPL CALC-MCNC: 77 MG/DL (ref ?–100)
LYMPHOCYTES # BLD AUTO: 2.2 X10(3) UL (ref 1–4)
LYMPHOCYTES NFR BLD AUTO: 34.9 %
MCH RBC QN AUTO: 30.2 PG (ref 26–34)
MCHC RBC AUTO-ENTMCNC: 33.3 G/DL (ref 31–37)
MCV RBC AUTO: 90.8 FL (ref 80–100)
MONOCYTES # BLD AUTO: 0.43 X10(3) UL (ref 0.1–1)
MONOCYTES NFR BLD AUTO: 6.8 %
NEUTROPHILS # BLD AUTO: 3.5 X10 (3) UL (ref 1.5–7.7)
NEUTROPHILS # BLD AUTO: 3.5 X10(3) UL (ref 1.5–7.7)
NEUTROPHILS NFR BLD AUTO: 55.5 %
NONHDLC SERPL-MCNC: 89 MG/DL (ref ?–130)
OSMOLALITY SERPL CALC.SUM OF ELEC: 300 MOSM/KG (ref 275–295)
PLATELET # BLD AUTO: 320 10(3)UL (ref 150–450)
POTASSIUM SERPL-SCNC: 4 MMOL/L (ref 3.5–5.1)
PROT SERPL-MCNC: 7 G/DL (ref 5.7–8.2)
RBC # BLD AUTO: 4.87 X10(6)UL (ref 4.3–5.7)
SODIUM SERPL-SCNC: 144 MMOL/L (ref 136–145)
TRIGL SERPL-MCNC: 56 MG/DL (ref 30–149)
TSI SER-ACNC: 1.72 UIU/ML (ref 0.55–4.78)
VIT D+METAB SERPL-MCNC: 30.7 NG/ML (ref 30–100)
VLDLC SERPL CALC-MCNC: 9 MG/DL (ref 0–30)
WBC # BLD AUTO: 6.3 X10(3) UL (ref 4–11)

## 2025-06-24 PROCEDURE — 82306 VITAMIN D 25 HYDROXY: CPT | Performed by: FAMILY MEDICINE

## 2025-06-24 PROCEDURE — 80050 GENERAL HEALTH PANEL: CPT | Performed by: FAMILY MEDICINE

## 2025-06-24 PROCEDURE — 80061 LIPID PANEL: CPT | Performed by: FAMILY MEDICINE

## 2025-06-24 NOTE — H&P
The 21st Century Cures Act makes medical notes like these available to patients in the interest of transparency. Please be advised this is a medical document. Medical documents are intended to carry relevant information, facts as evident, and the clinical opinion of the practitioner. The medical note is intended as peer to peer communication and may appear blunt or direct. It is written in medical language and may contain abbreviations or verbiage that are unfamiliar.     Peter Newby is a 39 year old male who presents for a complete physical exam.   HPI:   Pt complains of nothing.   + vit D def.  taking vit D cholecalciferol 1000 Unit once/day.  No fractures.    + snoring.  + unrefreshing sleep.  + daytime somnolence.  + fatigue.  + apnea.    Current Medications[1]   Past Medical History[2]   Past Surgical History[3]   Family History[4]   Social History:  Short Social Hx on File[5]        REVIEW OF SYSTEMS:   GENERAL: feels well otherwise  SKIN: denies any unusual skin lesions  EYES:denies blurred vision or double vision  HEENT: denies nasal congestion, sinus pain or ST, denies changes in hearing or vision  LUNGS: denies shortness of breath with exertion or frequent cough  CV: denies chest pain, pressure or palpitations  GI: denies abdominal pain,denies heartburn, denies chronic diarrhea or constipation  : denies nocturia or changes in stream, denies erectile dysfunction  MS: denies back pain, arthralgias or myalgias  NEURO: denies headaches or dizziness  PSYCH: denies depression or anxiety  HEMATOLOGIC: denies hx of anemia, easy bruising or bleeding  ENDOCRINE:denies frequent thirst or urination, denies unintentional weight gain/loss  ALL/ASTHMA: denies hx of allergy or asthma    EXAM:   /74   Pulse 64   Resp 20   Ht 5' 8\" (1.727 m)   Wt 207 lb   SpO2 98%   BMI 31.47 kg/m²   Body mass index is 31.47 kg/m².     GENERAL: well developed, well nourished,in no apparent distress  SKIN: no rashes,no  suspicious lesions  HEENT: atraumatic, normocephalic, PERRLA, conjunctiva clear, TMs clear, posterior pharynx clear, no congestion  NECK: supple,no adenopathy,no thyromegaly  LUNGS: clear to auscultation, easy breathing  CV: S1S2, RRR without murmur  GI: good BS's;no masses, HSM or tenderness  : two descended testes,no scrotal tenderness or mass, normal penis no lesions, no hernia  MS: [place], no bony deformities, gait normal  EXT: no cyanosis, clubbing or edema  NEURO: Oriented times three, strength 5/5 x 4 ext, LE DTRs 2+  PSYCH: mood and affect appropriate    ASSESSMENT AND PLAN:   Peter Newby is a 39 year old male who presents for a complete physical exam. Recommended heart healthy diet, routine exercise, and annual flu vaccines.  Routine testicular exams reinforced. The patient indicates understanding of these issues and agrees to the plan.  Follow up in 1 year.  1. Annual physical exam  - Lipid Panel; Future  - CBC With Differential With Platelet; Future  - Comp Metabolic Panel (14); Future  - TSH W Reflex To Free T4; Future  - Vitamin D; Future    2. Vitamin D deficiency  - Vitamin D; Future    3. AGUILAR (obstructive sleep apnea)  - Diagnostic Sleep Study-split night PAP implemented if criteria met  - General sleep study; Future    Orders Placed This Encounter   Procedures    Lipid Panel     Standing Status:   Future     Expected Date:   6/24/2025     Expiration Date:   6/24/2026    CBC With Differential With Platelet     Standing Status:   Future     Expected Date:   6/24/2025     Expiration Date:   6/24/2026    Comp Metabolic Panel (14)     Standing Status:   Future     Expected Date:   6/24/2025     Expiration Date:   6/24/2026    TSH W Reflex To Free T4     Standing Status:   Future     Expected Date:   6/24/2025     Expiration Date:   6/24/2026    Vitamin D     Standing Status:   Future     Expected Date:   6/24/2025     Expiration Date:   6/24/2026     Please pick the scenario that best fits the  purpose for ordering this test:   General Screening/Vit D deficiency (25-Hydroxy)     Release to patient:   Immediate      Note written by marianela.  Marianela is in the room with me.  Scribe has written note according to my dictation.  Reviewed scribe note.  Changed as appropriate.         [1]   Current Outpatient Medications   Medication Sig Dispense Refill    Cholecalciferol (VITAMIN D3) 25 MCG (1000 UT) Oral Cap Take 1 tablet by mouth in the morning.      acetaminophen 500 MG Oral Tab Take 1 tablet (500 mg total) by mouth as needed for Pain.     [2]   Past Medical History:   Incarcerated inguinal hernia, bilateral   [3]   Past Surgical History:  Procedure Laterality Date    Hernia surgery  4/8/2025    Other surgical history  [place] teeth removed    Vasectomy  02/04/2022    Dr. Lentz   [4]   Family History  Problem Relation Age of Onset    Other (ibs) Sister     Other (ibs) Sister     Breast Cancer Maternal Grandmother     Cancer Maternal Grandmother     Heart Disease Paternal Grandfather     Diabetes Paternal Grandfather     Hypertension Paternal Grandfather    [5]   Social History  Socioeconomic History    Marital status:    Tobacco Use    Smoking status: Some Days     Types: Cigars    Smokeless tobacco: Never    Tobacco comments:     Cigars 3-4 times per year   Vaping Use    Vaping status: Never Used   Substance and Sexual Activity    Alcohol use: Yes     Alcohol/week: 2.0 standard drinks of alcohol     Types: 2 Cans of beer per week     Comment: socially     Drug use: Never   Other Topics Concern    Caffeine Concern Yes     Comment: pop weekly, 2 grinds    Exercise Yes     Comment: walking daily

## (undated) DEVICE — COVER,LIGHT,CAMERA,HARD,1/PK,STRL: Brand: MEDLINE

## (undated) DEVICE — SEAL

## (undated) DEVICE — DRAPE,TOP,102X53,STERILE: Brand: MEDLINE

## (undated) DEVICE — 40580 - THE PINK PAD - ADVANCED TRENDELENBURG POSITIONING KIT: Brand: 40580 - THE PINK PAD - ADVANCED TRENDELENBURG POSITIONING KIT

## (undated) DEVICE — 3M™ IOBAN™ 2 ANTIMICROBIAL INCISE DRAPE 6648EZ: Brand: IOBAN™ 2

## (undated) DEVICE — STERILE DRAPE FOR USE WITH SITUATE ROOM SCANNER: Brand: SITUATE

## (undated) DEVICE — SUT MCRYL 4-0 18IN PS-2 ABSRB UD 19MM 3/8 CIR

## (undated) DEVICE — LAPAROSCOPIC DUAL RIGID APPLICATOR: Brand: ETHICON

## (undated) DEVICE — GLOVE SUR 7.5 SENSICARE PI PIP GRN PWD F

## (undated) DEVICE — MEGA NEEDLE DRIVER: Brand: ENDOWRIST

## (undated) DEVICE — LAPAROVUE VISIBILITY SYSTEM LAPAROSCOPIC SOLUTIONS: Brand: LAPAROVUE

## (undated) DEVICE — ARM DRAPE

## (undated) DEVICE — GLOVE SUR 7 SENSICARE PI PIP CRM PWD F

## (undated) DEVICE — BLADELESS OBTURATOR: Brand: WECK VISTA

## (undated) DEVICE — SUT STRATAFIX 2-0 9IN SH ABSRB VLT 26MM 1/2 C

## (undated) DEVICE — COLUMN DRAPE

## (undated) DEVICE — FENESTRATED BIPOLAR FORCEPS: Brand: ENDOWRIST

## (undated) DEVICE — SUT PDS II 2-0 27IN SH ABSRB VLT L26MM 1/2

## (undated) DEVICE — TIP COVER ACCESSORY

## (undated) DEVICE — Device: Brand: SUTURE PASSOR PRO

## (undated) DEVICE — GLOVE SUR 6.5 SENSICARE PI PIP CRM PWD F

## (undated) DEVICE — ROBOTIC GENERAL: Brand: MEDLINE INDUSTRIES, INC.

## (undated) DEVICE — BINDER ABD 2XL W9IN CIRC 62-73IN 3 PNL E HK

## (undated) DEVICE — FORCE BIPOLAR: Brand: ENDOWRIST

## (undated) DEVICE — MONOPOLAR CURVED SCISSORS: Brand: ENDOWRIST